# Patient Record
Sex: MALE | Race: WHITE | NOT HISPANIC OR LATINO | Employment: OTHER | URBAN - METROPOLITAN AREA
[De-identification: names, ages, dates, MRNs, and addresses within clinical notes are randomized per-mention and may not be internally consistent; named-entity substitution may affect disease eponyms.]

---

## 2019-04-15 ENCOUNTER — HOSPITAL ENCOUNTER (OUTPATIENT)
Dept: CT IMAGING | Facility: CLINIC | Age: 64
Discharge: HOME | End: 2019-04-15
Attending: PHYSICIAN ASSISTANT | Admitting: PHYSICIAN ASSISTANT
Payer: MEDICARE

## 2019-04-15 ENCOUNTER — HOSPITAL ENCOUNTER (OUTPATIENT)
Dept: GENERAL RADIOLOGY | Facility: CLINIC | Age: 64
Discharge: HOME | End: 2019-04-15
Attending: PHYSICIAN ASSISTANT | Admitting: PHYSICIAN ASSISTANT
Payer: MEDICARE

## 2019-04-15 DIAGNOSIS — K22.70 BARRETT'S ESOPHAGUS WITHOUT DYSPLASIA: ICD-10-CM

## 2019-04-15 DIAGNOSIS — R13.10 DYSPHAGIA, UNSPECIFIED TYPE: ICD-10-CM

## 2019-04-15 DIAGNOSIS — R03.0 ELEVATED BLOOD PRESSURE READING WITHOUT DIAGNOSIS OF HYPERTENSION: ICD-10-CM

## 2019-04-15 DIAGNOSIS — Z71.3 DIETARY COUNSELING AND SURVEILLANCE: ICD-10-CM

## 2019-04-15 PROCEDURE — 25000128 H RX IP 250 OP 636: Performed by: PHYSICIAN ASSISTANT

## 2019-04-15 PROCEDURE — 74220 X-RAY XM ESOPHAGUS 1CNTRST: CPT

## 2019-04-15 PROCEDURE — 71260 CT THORAX DX C+: CPT

## 2019-04-15 PROCEDURE — 25500045 ZZH RX 255: Performed by: PHYSICIAN ASSISTANT

## 2019-04-15 RX ORDER — IOPAMIDOL 755 MG/ML
500 INJECTION, SOLUTION INTRAVASCULAR ONCE
Status: COMPLETED | OUTPATIENT
Start: 2019-04-15 | End: 2019-04-15

## 2019-04-15 RX ADMIN — SODIUM CHLORIDE 60 ML: 9 INJECTION, SOLUTION INTRAVENOUS at 13:26

## 2019-04-15 RX ADMIN — IOPAMIDOL 80 ML: 755 INJECTION, SOLUTION INTRAVENOUS at 13:26

## 2019-04-15 RX ADMIN — ANTACID/ANTIFLATULENT 4 G: 380; 550; 10; 10 GRANULE, EFFERVESCENT ORAL at 14:00

## 2019-09-05 ENCOUNTER — HOSPITAL ENCOUNTER (OUTPATIENT)
Dept: GENERAL RADIOLOGY | Facility: CLINIC | Age: 64
Discharge: HOME | End: 2019-09-05
Attending: INTERNAL MEDICINE | Admitting: INTERNAL MEDICINE
Payer: MEDICARE

## 2019-09-05 DIAGNOSIS — K22.0 ACHALASIA: ICD-10-CM

## 2019-09-05 PROCEDURE — 74220 X-RAY XM ESOPHAGUS 1CNTRST: CPT

## 2019-11-13 ENCOUNTER — HOSPITAL ENCOUNTER (OUTPATIENT)
Dept: SPEECH THERAPY | Facility: CLINIC | Age: 64
Setting detail: THERAPIES SERIES
End: 2019-11-13
Attending: OTOLARYNGOLOGY
Payer: MEDICARE

## 2019-11-13 PROCEDURE — 31579 LARYNGOSCOPY TELESCOPIC: CPT | Performed by: STUDENT IN AN ORGANIZED HEALTH CARE EDUCATION/TRAINING PROGRAM

## 2019-11-13 PROCEDURE — 92524 BEHAVRAL QUALIT ANALYS VOICE: CPT | Mod: GN | Performed by: STUDENT IN AN ORGANIZED HEALTH CARE EDUCATION/TRAINING PROGRAM

## 2019-12-03 NOTE — PROGRESS NOTES
[facility]          OUTPATIENT SPEECH LANGUAGE PATHOLOGY VOICE EVALUATION  PLAN OF TREATMENT FOR OUTPATIENT REHABILITATION  (COMPLETE FOR INITIAL CLAIMS ONLY)    Patient's Last Name, First Name, M.I.  YOB: 1955  Jose Bennett                           Provider s Name: [facility] Medical Record No.  [id number]     Onset Date:  10/28/2019    Start of Care Date:  11/13/2019   Type:     ___PT  __OT   _X_SLP    Medical Diagnosis: Dysphonia   Speech Language Pathology Diagnosis:  Dysphonia    Visits from SOC: 1      _________________________________________________________________________________  Plan of Treatment/Functional Goals:  Voice         Goals     1. Goal Identifier: Generalization       Goal Description: Patient will report a week of typical activities in which dysphonia does not exceed a level of 2 out of 10, 90% of the time, so that patient is able to meet his vocal demands at work and at home.       Target Date: 02/11/20   2. Goal Identifier: Voice quality       Goal Description: In a 20-minute speech task, patient will demonstrate roughness, breathiness, and strain that do not exceed a level of 2 out of 10, 90% of the time by SLP judgment, so that patient is able to meet his voice quality demands.       Target Date: 02/11/20   3. Goal Identifier: Vocal function       Goal Description: In order to improve laryngeal strength, flexibility, and coordination for daily vocal tasks, patient will extend average maximum phonation time in exercise 4 of modified Vocal Function Exercises to 14 seconds without strain when given min assist.       Target Date: 02/11/20                    Frequency and Duration: 1x/week for 7 weeks with 2-3 monthly follow-ups  Allison E. Alpers, SLP       I CERTIFY THE NEED FOR THESE SERVICES FURNISHED UNDER        THIS PLAN OF TREATMENT AND WHILE UNDER   CARE .             Physician Signature               Date    X_____________________________________________________                      Certification Date From:  11/13/19  Certification Date To:  02/11/20  Referring Provider: Pop Simpson MD                 Initial Assessment        See Epic Evaluation Start of Care

## 2019-12-03 NOTE — PROGRESS NOTES
[facility] OP SLP Voice Evaluation with Videostroboscopy  11/13/19 7422   General Information   Type Of Visit Initial   Start Of Care Date 11/13/19   Referring Physician Pop Zhang MD  (ENT)   Orders Evaluate And Treat;Other  (Videostroboscopy)   Medical Diagnosis Dysphonia   Onset Of Illness/injury Or Date Of Surgery 10/28/19  (order date)   Precautions/Limitations  swallowing precautions   Hearing WFL for 1:1 conversation in session   Surgical/Medical history reviewed Yes   Pertinent History Of Current Problem 63yo male presenting with worsening dysphonia over the past year.  Pt reports a gradual onset of hoarseness, especially after a procedure about 4-5 months ago for achalasia.  Pt reports that people notice that his voice is different, but have no difficulty understanding him.  Pt is unable to talk in a high-pitched voice, which he used to be able to do.  Pt denies difficulties with vocal fatigue, throat pain/discomfort, cough/throat clear, and breathing.  PMH significant for achalasia, prostate cancer, Lisa's esophagus, HLD.  Recent XR esophagram negative for reflux.  Recent chest CT significant for achalasia.   Prior Level of Functioning No previous problems.   Patient Role/employment History Employed  (Psychologist)   General Observations Pt reports that today is a typical voice day.   Patient/family Goals To learn what is causing his voice problems, to make sure his voice doesn't get worse   Personal Rating / Voice Use Rating   Describe the amount of voice use required for your job Moderate   Describe the intensity of voice use required for your job Moderate   Describe the amount of typical non-work voice use Moderate   Describe the intensity of typical non-work voice use Moderate   Comments Frequent voice use as a psychologist.  People occasionally ask him about what's wrong with his voice.  VHI-10: 1/40.   Evaluation Results   Voice Observations VISIBLE TENSION: neck.   Voice  Profile during conversation, 1 min monologue and paragraph reading   Voice Quality Airy;Scratchy;Thin   Voice quality comments SPEECH: Consistent mild-moderate strain and breathiness with intermittent mild roughness, particularly at ends of sentences.  SINGING: improved relative to speech, with less breathiness and strain.  THERAPY PROBES: good improvement in voice quality with flow mode, forward resonance, diaphragmatic engagement, and glottal closure probes.   Voice quality severity rating continuum (1=Severe, 7=WNL) 5  (CAPE-V Overall Severity: 26/100)   Breath control Tight   Breath Control comments Excessive thoracic muscle use pattern on inspiration for speech.  Poor respiratory/phonatory coordination.   Breath control severity rating continuum (1=Severe, 7=WNL) 5   Voice Use / Effort Pinched / squeezed larynx;Contraction of neck muscles;Throat push   Voice Use / Effort comments Pt rates his current phonatory effort for speech as 0/10 (10 is maximum effort).   Voice use / Effort severity rating continuum (1=Severe, 7=WNL) 5   Fundamental frequency (Hz)   (Centered around Eb3)   Pitch /Frequency Description Too low   Pitch / Frequency comments Pt is not stimulable for phonation in falsetto/loft register, with pt noting that this is a change.   Pitch / Frequency severity rating continuum (1=Severe, 7=WNL) 5   Volume comments Volume for conversational speech is WFL and appropriate.  A whisper is normal.  Soft phonation is consistent with conversational volume/quality.  Loud phonation is minimally breathy, with good volume increase and some neck involvement.   Volume severity rating continuum (1=Severe, 7=WNL) 6   Neuromuscular Control Other    Neuromuscular Control severity rating continuum (1=Severe, 7=WNL) 6   Neuromuscular comments See adduction/abduction function measure below   Resonance Muffled   Resonance severity rating continuum (1=Severe, 7=WNL) 5   Comments Mild-moderate dysphonia characterized by  roughness, breathiness, strain, muffled resonance, poor respiratory/phonatory coordination, lack of upper register, and tight laryngeal musculature with neck involvement during phonation.   Adduction /Abduction Function   Laryngeal diadokinetic speed   (WNL)   Laryngeal diadokinetic strength   (Strained)   Laryngeal diadokinetic consistency Run together;Regular  (Initially blurred, but improves with task duration)   Adduction / Abduction function scale Age 66+, norm per sec:  4   Vibratory Function of Vocal Folds   Prolonged 'ah' at mid pitch (sec) 4.8 seconds at E3 with consistent strain and increased breathiness   Prolonged 'ah' at high pitch (sec) 7.1 seconds at F3 with increased breathiness and strain and visible neck effort   Prolonged 'ah' at low pitch (sec) 11.4 seconds at F3 with increased strain and breathiness and some roughness   Vibratory Function of Vocal Folds Scale Males 20 - 80 yrs: 15 - 25 secs   Vibratory Function of Vocal Folds Comments Reduced in duration and quality   Function of Lengtheners / Shorteners (CT and TA Muscles)   Pitch glides Limited range;Upper register absent  (Lowest: Bb2; Highest: A3)   Videostroboscopy / Endoscopy   Scope Serial Number Pentax [id number], Aiwogrm=[id number]   Tissue description Smooth pink larynx tissues;White vocal fold tissues   Comment Fatigue and worsening sluggishness of right vocal fold observed in a task of quickly repeated vowels (/hi/-sniff), which may indicate impairment of the right superior laryngeal nerve and/or right recurrent laryngeal nerve.   Laryngeal movements Reduced lengthening of TFV;Adduction / abduction full range of motion (ROM)  (R TVF is sluggish compared to L, but with full ROM)   Vibratory characteristics Reduced vibrations right;Full mucosal wave left  (Open phase predominant, with asymmetry)   Vocal fold edges Smooth  (Bilaterally prominent vocal fold processes, R>L)   Glottal closure characteristics Anterior gap  (With prominent R vocal  process, improves with therapy probes)   Supraglottic activity Anterior-posterior compression;Ventricular folds hypertrophy - bilateral  (Mild-moderate, improves with therapy probes)   Other observations Flexible laryngoscopy with videostroboscopy completed following application of topical anesthetic (3% Lidocaine HCL, 0.25% Phenlyphrine HCL) and after obtaining informed verbal consent.  Scope was inserted throught the left nostril.  Pt tolerated the procedure well.   General Therapy Interventions   Planned Therapy Interventions Voice   Voice Larynx strengthening;Larynx and TVF flexibility;Larynx movement/coordination;Breath flow to sound flow;Voice quality/pitch or volume tasks;Resonant voice techniques   Impressions and Recommendations   Communication Diagnosis Dysphonia   Summary Mr. Bennett presents with mild-moderate dysphonia characterized by roughness, breathiness, strain, muffled resonance, poor respiratory/phonatory coordination, lack of upper register, and tight laryngeal musculature with neck involvement during phonation.  Based on today's evaluation and videostroboscopy, dysphonia is accounted for by a combination of vocal fold bowing/poor glottal closure and resulting aberrations in vibratory characteristics and mucosal wave, impaired laryngeal movements (sluggish right vocal fold, reduced elongation of true vocal folds) possibly indicating impairment of the superior and/or recurrent laryngeal nerves, and supraglottic hyperfunction.   Recommendations A course of skilled speech therapy is recommended in order to promote improved laryngeal strength, flexibility, and coordination for daily vocal tasks, optimize vocal technique, improve voice quality, and promote reduced laryngeal effort so that patient is able to meet his vocal demands at work and at home.   Frequency and Duration 1x/week for 7 weeks with 2-3 monthly follow-ups   Prognosis  Good with intervention   Risks and Benefits of Treatment have  been explained. Yes   Patient & /or Caregiver  in agreement with plan of care Yes   Patient Education SLP provided education regarding evaluation and videostroboscopy findings, as well as proposed POC.  Pt verbalized understanding.   Educational Assessment   Barriers to Learning No barriers   Preferred Learning Style Listening;Reading;Demonstration;Pictures / Video   Voice Goals   Voice Goals 1;2;3   Voice Goal 1   Goal Identifier Generalization   Goal Description Patient will report a week of typical activities in which dysphonia does not exceed a level of 2 out of 10, 90% of the time, so that patient is able to meet his vocal demands at work and at home.   Target Date 02/11/20   Voice Goal 2   Goal Identifier Voice quality   Goal Description In a 20-minute speech task, patient will demonstrate roughness, breathiness, and strain that do not exceed a level of 2 out of 10, 90% of the time by SLP judgment, so that patient is able to meet his voice quality demands.   Target Date 02/11/20   Voice Goal 3   Goal Identifier Vocal function   Goal Description In order to improve laryngeal strength, flexibility, and coordination for daily vocal tasks, patient will extend average maximum phonation time in exercise 4 of modified Vocal Function Exercises to 14 seconds without strain when given min assist.   Target Date 02/11/20   Total Session Time   Voice Minutes (68269) 45   Laryngoscopy W/Stroboscopy Minutes (90636) 30   Total Evaluation Time 75   Therapy Certification   Certification date from 11/13/19   Certification date to 02/11/20   Medical Diagnosis Dysphonia             Thank you for the referral of this patient.    Allison Alpers, B.A. (shannon), M.A., CCC-SLP  Speech-Language Pathologist  Certificate of Vocology  [facility]  [phone]

## 2022-01-09 ENCOUNTER — HOSPITAL ENCOUNTER (OUTPATIENT)
Facility: CLINIC | Age: 67
Setting detail: OBSERVATION
Discharge: HOME | End: 2022-01-10
Attending: EMERGENCY MEDICINE | Admitting: INTERNAL MEDICINE
Payer: MEDICARE

## 2022-01-09 ENCOUNTER — APPOINTMENT (OUTPATIENT)
Dept: ULTRASOUND IMAGING | Facility: CLINIC | Age: 67
End: 2022-01-09
Attending: EMERGENCY MEDICINE
Payer: MEDICARE

## 2022-01-09 ENCOUNTER — APPOINTMENT (OUTPATIENT)
Dept: CT IMAGING | Facility: CLINIC | Age: 67
End: 2022-01-09
Attending: EMERGENCY MEDICINE
Payer: MEDICARE

## 2022-01-09 DIAGNOSIS — K81.0 ACUTE CHOLECYSTITIS: ICD-10-CM

## 2022-01-09 LAB
ALBUMIN SERPL-MCNC: 3.6 G/DL (ref 3.4–5)
ALBUMIN UR-MCNC: NEGATIVE MG/DL
ALP SERPL-CCNC: 51 U/L (ref 40–150)
ALT SERPL W P-5'-P-CCNC: 29 U/L (ref 0–70)
ANION GAP SERPL CALCULATED.3IONS-SCNC: 7 MMOL/L (ref 3–14)
APPEARANCE UR: CLEAR
AST SERPL W P-5'-P-CCNC: 16 U/L (ref 0–45)
BACTERIA #/AREA URNS HPF: ABNORMAL /HPF
BASOPHILS # BLD AUTO: 0 10E3/UL (ref 0–0.2)
BASOPHILS NFR BLD AUTO: 0 %
BILIRUB SERPL-MCNC: 0.7 MG/DL (ref 0.2–1.3)
BILIRUB UR QL STRIP: NEGATIVE
BUN SERPL-MCNC: 20 MG/DL (ref 7–30)
CALCIUM SERPL-MCNC: 8.9 MG/DL (ref 8.5–10.1)
CHLORIDE BLD-SCNC: 102 MMOL/L (ref 94–109)
CO2 SERPL-SCNC: 26 MMOL/L (ref 20–32)
COLOR UR AUTO: ABNORMAL
CREAT SERPL-MCNC: 0.93 MG/DL (ref 0.66–1.25)
EOSINOPHIL # BLD AUTO: 0 10E3/UL (ref 0–0.7)
EOSINOPHIL NFR BLD AUTO: 0 %
ERYTHROCYTE [DISTWIDTH] IN BLOOD BY AUTOMATED COUNT: 12.1 % (ref 10–15)
GFR SERPL CREATININE-BSD FRML MDRD: 89 ML/MIN/1.73M2
GLUCOSE BLD-MCNC: 184 MG/DL (ref 70–99)
GLUCOSE UR STRIP-MCNC: NEGATIVE MG/DL
HCT VFR BLD AUTO: 43.9 % (ref 40–53)
HGB BLD-MCNC: 14.9 G/DL (ref 13.3–17.7)
HGB UR QL STRIP: NEGATIVE
IMM GRANULOCYTES # BLD: 0.1 10E3/UL
IMM GRANULOCYTES NFR BLD: 0 %
KETONES UR STRIP-MCNC: NEGATIVE MG/DL
LEUKOCYTE ESTERASE UR QL STRIP: NEGATIVE
LIPASE SERPL-CCNC: 60 U/L (ref 73–393)
LYMPHOCYTES # BLD AUTO: 1.4 10E3/UL (ref 0.8–5.3)
LYMPHOCYTES NFR BLD AUTO: 9 %
MCH RBC QN AUTO: 32.3 PG (ref 26.5–33)
MCHC RBC AUTO-ENTMCNC: 33.9 G/DL (ref 31.5–36.5)
MCV RBC AUTO: 95 FL (ref 78–100)
MONOCYTES # BLD AUTO: 0.7 10E3/UL (ref 0–1.3)
MONOCYTES NFR BLD AUTO: 5 %
NEUTROPHILS # BLD AUTO: 14.3 10E3/UL (ref 1.6–8.3)
NEUTROPHILS NFR BLD AUTO: 86 %
NITRATE UR QL: NEGATIVE
NRBC # BLD AUTO: 0 10E3/UL
NRBC BLD AUTO-RTO: 0 /100
PH UR STRIP: 6 [PH] (ref 5–7)
PLATELET # BLD AUTO: 208 10E3/UL (ref 150–450)
POTASSIUM BLD-SCNC: 3.3 MMOL/L (ref 3.4–5.3)
PROT SERPL-MCNC: 7 G/DL (ref 6.8–8.8)
RBC # BLD AUTO: 4.62 10E6/UL (ref 4.4–5.9)
RBC URINE: 1 /HPF
SARS-COV-2 RNA RESP QL NAA+PROBE: NEGATIVE
SODIUM SERPL-SCNC: 135 MMOL/L (ref 133–144)
SP GR UR STRIP: 1.01 (ref 1–1.03)
UROBILINOGEN UR STRIP-MCNC: NORMAL MG/DL
WBC # BLD AUTO: 16.5 10E3/UL (ref 4–11)
WBC URINE: 1 /HPF

## 2022-01-09 PROCEDURE — 96368 THER/DIAG CONCURRENT INF: CPT

## 2022-01-09 PROCEDURE — 36415 COLL VENOUS BLD VENIPUNCTURE: CPT | Performed by: EMERGENCY MEDICINE

## 2022-01-09 PROCEDURE — 96375 TX/PRO/DX INJ NEW DRUG ADDON: CPT | Mod: XU

## 2022-01-09 PROCEDURE — 99285 EMERGENCY DEPT VISIT HI MDM: CPT | Mod: 25

## 2022-01-09 PROCEDURE — 74177 CT ABD & PELVIS W/CONTRAST: CPT

## 2022-01-09 PROCEDURE — 96366 THER/PROPH/DIAG IV INF ADDON: CPT

## 2022-01-09 PROCEDURE — 93005 ELECTROCARDIOGRAM TRACING: CPT

## 2022-01-09 PROCEDURE — 250N000011 HC RX IP 250 OP 636: Performed by: EMERGENCY MEDICINE

## 2022-01-09 PROCEDURE — 99219 PR INITIAL OBSERVATION CARE,LEVEL II: CPT | Performed by: PHYSICIAN ASSISTANT

## 2022-01-09 PROCEDURE — 96365 THER/PROPH/DIAG IV INF INIT: CPT | Mod: 59

## 2022-01-09 PROCEDURE — 81001 URINALYSIS AUTO W/SCOPE: CPT | Performed by: EMERGENCY MEDICINE

## 2022-01-09 PROCEDURE — 250N000011 HC RX IP 250 OP 636: Performed by: PHYSICIAN ASSISTANT

## 2022-01-09 PROCEDURE — 83690 ASSAY OF LIPASE: CPT | Performed by: EMERGENCY MEDICINE

## 2022-01-09 PROCEDURE — 87635 SARS-COV-2 COVID-19 AMP PRB: CPT | Performed by: EMERGENCY MEDICINE

## 2022-01-09 PROCEDURE — C9803 HOPD COVID-19 SPEC COLLECT: HCPCS

## 2022-01-09 PROCEDURE — 85025 COMPLETE CBC W/AUTO DIFF WBC: CPT | Performed by: EMERGENCY MEDICINE

## 2022-01-09 PROCEDURE — 76705 ECHO EXAM OF ABDOMEN: CPT

## 2022-01-09 PROCEDURE — G0378 HOSPITAL OBSERVATION PER HR: HCPCS

## 2022-01-09 PROCEDURE — 80053 COMPREHEN METABOLIC PANEL: CPT | Performed by: EMERGENCY MEDICINE

## 2022-01-09 RX ORDER — SODIUM CHLORIDE AND POTASSIUM CHLORIDE 150; 900 MG/100ML; MG/100ML
INJECTION, SOLUTION INTRAVENOUS CONTINUOUS
Status: DISCONTINUED | OUTPATIENT
Start: 2022-01-09 | End: 2022-01-10 | Stop reason: HOSPADM

## 2022-01-09 RX ORDER — MORPHINE SULFATE 2 MG/ML
2-4 INJECTION, SOLUTION INTRAMUSCULAR; INTRAVENOUS
Status: DISCONTINUED | OUTPATIENT
Start: 2022-01-09 | End: 2022-01-10 | Stop reason: HOSPADM

## 2022-01-09 RX ORDER — PROCHLORPERAZINE MALEATE 5 MG
5 TABLET ORAL EVERY 6 HOURS PRN
Status: DISCONTINUED | OUTPATIENT
Start: 2022-01-09 | End: 2022-01-10 | Stop reason: HOSPADM

## 2022-01-09 RX ORDER — ACETAMINOPHEN 325 MG/1
650 TABLET ORAL EVERY 6 HOURS PRN
Status: DISCONTINUED | OUTPATIENT
Start: 2022-01-09 | End: 2022-01-10 | Stop reason: HOSPADM

## 2022-01-09 RX ORDER — ACETAMINOPHEN 650 MG/1
650 SUPPOSITORY RECTAL EVERY 6 HOURS PRN
Status: DISCONTINUED | OUTPATIENT
Start: 2022-01-09 | End: 2022-01-10 | Stop reason: HOSPADM

## 2022-01-09 RX ORDER — AMOXICILLIN 250 MG
2 CAPSULE ORAL 2 TIMES DAILY PRN
Status: DISCONTINUED | OUTPATIENT
Start: 2022-01-09 | End: 2022-01-10 | Stop reason: HOSPADM

## 2022-01-09 RX ORDER — LIDOCAINE 40 MG/G
CREAM TOPICAL
Status: DISCONTINUED | OUTPATIENT
Start: 2022-01-09 | End: 2022-01-10 | Stop reason: HOSPADM

## 2022-01-09 RX ORDER — TESTOSTERONE GEL, 1% 10 MG/G
2 GEL TRANSDERMAL
COMMUNITY

## 2022-01-09 RX ORDER — KETOROLAC TROMETHAMINE 15 MG/ML
15 INJECTION, SOLUTION INTRAMUSCULAR; INTRAVENOUS ONCE
Status: COMPLETED | OUTPATIENT
Start: 2022-01-09 | End: 2022-01-09

## 2022-01-09 RX ORDER — OXYCODONE HYDROCHLORIDE 5 MG/1
5-10 TABLET ORAL EVERY 4 HOURS PRN
Status: DISCONTINUED | OUTPATIENT
Start: 2022-01-09 | End: 2022-01-10 | Stop reason: HOSPADM

## 2022-01-09 RX ORDER — AMPICILLIN AND SULBACTAM 2; 1 G/1; G/1
3 INJECTION, POWDER, FOR SOLUTION INTRAMUSCULAR; INTRAVENOUS ONCE
Status: COMPLETED | OUTPATIENT
Start: 2022-01-09 | End: 2022-01-09

## 2022-01-09 RX ORDER — AMOXICILLIN 250 MG
1 CAPSULE ORAL 2 TIMES DAILY PRN
Status: DISCONTINUED | OUTPATIENT
Start: 2022-01-09 | End: 2022-01-10 | Stop reason: HOSPADM

## 2022-01-09 RX ORDER — ONDANSETRON 2 MG/ML
4 INJECTION INTRAMUSCULAR; INTRAVENOUS
Status: DISCONTINUED | OUTPATIENT
Start: 2022-01-09 | End: 2022-01-09

## 2022-01-09 RX ORDER — PROCHLORPERAZINE 25 MG
12.5 SUPPOSITORY, RECTAL RECTAL EVERY 12 HOURS PRN
Status: DISCONTINUED | OUTPATIENT
Start: 2022-01-09 | End: 2022-01-10 | Stop reason: HOSPADM

## 2022-01-09 RX ORDER — ONDANSETRON 2 MG/ML
4 INJECTION INTRAMUSCULAR; INTRAVENOUS EVERY 6 HOURS PRN
Status: DISCONTINUED | OUTPATIENT
Start: 2022-01-09 | End: 2022-01-10 | Stop reason: HOSPADM

## 2022-01-09 RX ORDER — ONDANSETRON 4 MG/1
4 TABLET, ORALLY DISINTEGRATING ORAL EVERY 6 HOURS PRN
Status: DISCONTINUED | OUTPATIENT
Start: 2022-01-09 | End: 2022-01-10 | Stop reason: HOSPADM

## 2022-01-09 RX ORDER — KETOROLAC TROMETHAMINE 15 MG/ML
15 INJECTION, SOLUTION INTRAMUSCULAR; INTRAVENOUS EVERY 6 HOURS PRN
Status: DISCONTINUED | OUTPATIENT
Start: 2022-01-09 | End: 2022-01-10 | Stop reason: HOSPADM

## 2022-01-09 RX ORDER — IOPAMIDOL 755 MG/ML
90 INJECTION, SOLUTION INTRAVASCULAR ONCE
Status: COMPLETED | OUTPATIENT
Start: 2022-01-09 | End: 2022-01-09

## 2022-01-09 RX ADMIN — ONDANSETRON 4 MG: 2 INJECTION INTRAMUSCULAR; INTRAVENOUS at 14:14

## 2022-01-09 RX ADMIN — AMPICILLIN SODIUM AND SULBACTAM SODIUM 3 G: 2; 1 INJECTION, POWDER, FOR SOLUTION INTRAMUSCULAR; INTRAVENOUS at 17:19

## 2022-01-09 RX ADMIN — POTASSIUM CHLORIDE AND SODIUM CHLORIDE: 900; 150 INJECTION, SOLUTION INTRAVENOUS at 23:30

## 2022-01-09 RX ADMIN — KETOROLAC TROMETHAMINE 15 MG: 15 INJECTION, SOLUTION INTRAMUSCULAR; INTRAVENOUS at 14:15

## 2022-01-09 RX ADMIN — IOPAMIDOL 90 ML: 755 INJECTION, SOLUTION INTRAVENOUS at 14:34

## 2022-01-09 RX ADMIN — TAZOBACTAM SODIUM AND PIPERACILLIN SODIUM 3.38 G: 375; 3 INJECTION, SOLUTION INTRAVENOUS at 23:30

## 2022-01-09 NOTE — ED PROVIDER NOTES
History     Chief Complaint:    Abdominal Pain      HPI   Jose Bennett is a 66 year old male with history of kidney stone 10 to 12 years ago status post stent and lithotripsy, dilation procedure for achalasia, and prostatectomy, who presents for evaluation of diffuse abdominal pain.  He states the pain began to the periumbilical and epigastric area yesterday evening approximately 9:30 PM.  He has felt bloated but has had no nausea or vomiting (he indicates he is uncertain if he can vomit after his esophageal dilation procedure).  Overnight the pain was constant regardless of movement or taking Gas-X.  The pain is now moved slightly to the left upper left mid abdomen and 7.5 out of 10.  He feels the urge to defecate but has had no diarrhea or bowel movement.  He denies dysuria, hematuria, frequency, fevers, chills, or any other concerns.    Review of Systems  GI: + as per above  All other systems reviewed and negative      Allergies:  Pollen   Homeopathic products     Medications:    Omeprazole    Past Medical History:    Pituitary gland tumor benign  Achalasia   Hypercholesteremia   Hypogonadism   Vasovagal syncope   Ureterolithiasis   Hyperprolactinemia      Past Surgical History:    Hernia repair   Prostatectomy   Esophagogastroduodenoscopy   Peroral endoscopic myotomy  Pilonidal cyst excision      Family History:    The patient denies past family history.     Social History:  Presents with wife    Physical Exam     Patient Vitals for the past 24 hrs:   BP Temp Temp src Pulse Resp SpO2 Weight   01/09/22 1353 (!) 125/95 98.3  F (36.8  C) Temporal 103 18 100 % 81.2 kg (179 lb)       Physical Exam  Constitutional: Alert, attentive  HENT:    Nose: Nose normal.    Mouth/Throat: Oropharynx is clear, mucous membranes are moist  Eyes:  EOM are normal.    CV:  regular rate and rhythm; no murmurs, rubs or gallups  Chest: Effort normal and breath sounds normal.   GI:   Epigastric, LUQ, and RUQ tenderness   No  distension. Normal bowel sounds  MSK: Normal range of motion.   Neurological: Alert, attentive  Skin: Skin is warm and dry.      Emergency Department Course       Imaging:  US Abdomen Limited   Final Result   IMPRESSION:   1.  Distended gallbladder with debris in the gallbladder, gallbladder wall thickening, and pericholecystic fluid. Findings again represent acute cholecystitis.   2.  No intrahepatic bile duct dilatation. The extrahepatic bile ducts are obscured by bowel gas.            CT Abdomen Pelvis w Contrast   Final Result   IMPRESSION:    1.  The gallbladder is distended with pericolic cystic fluid and mild fat stranding, worrisome for acute cholecystitis. There is a small stone in the gallbladder neck/cystic duct, likely a cause of the gallbladder inflammation.   2.  Trace amount of free fluid in the pelvis, indeterminate, could be reactive.   3.  The distal esophagus is dilated, consistent with patient's history of achalasia.          Laboratory:  Labs Ordered and Resulted from Time of ED Arrival to Time of ED Departure   COMPREHENSIVE METABOLIC PANEL - Abnormal       Result Value    Sodium 135      Potassium 3.3 (*)     Chloride 102      Carbon Dioxide (CO2) 26      Anion Gap 7      Urea Nitrogen 20      Creatinine 0.93      Calcium 8.9      Glucose 184 (*)     Alkaline Phosphatase 51      AST 16      ALT 29      Protein Total 7.0      Albumin 3.6      Bilirubin Total 0.7      GFR Estimate 89     LIPASE - Abnormal    Lipase 60 (*)    ROUTINE UA WITH MICROSCOPIC - Abnormal    Color Urine Straw      Appearance Urine Clear      Glucose Urine Negative      Bilirubin Urine Negative      Ketones Urine Negative      Specific Gravity Urine 1.006      Blood Urine Negative      pH Urine 6.0      Protein Albumin Urine Negative      Urobilinogen Urine Normal      Nitrite Urine Negative      Leukocyte Esterase Urine Negative      Bacteria Urine Few (*)     RBC Urine 1      WBC Urine 1     CBC WITH PLATELETS AND  DIFFERENTIAL - Abnormal    WBC Count 16.5 (*)     RBC Count 4.62      Hemoglobin 14.9      Hematocrit 43.9      MCV 95      MCH 32.3      MCHC 33.9      RDW 12.1      Platelet Count 208      % Neutrophils 86      % Lymphocytes 9      % Monocytes 5      % Eosinophils 0      % Basophils 0      % Immature Granulocytes 0      NRBCs per 100 WBC 0      Absolute Neutrophils 14.3 (*)     Absolute Lymphocytes 1.4      Absolute Monocytes 0.7      Absolute Eosinophils 0.0      Absolute Basophils 0.0      Absolute Immature Granulocytes 0.1      Absolute NRBCs 0.0     COVID-19 VIRUS (CORONAVIRUS) BY PCR       Emergency Department Course:  Reviewed:  I reviewed nursing notes, vitals, past history and care everywhere    Assessments:   I obtained history and examined the patient as noted above.    I rechecked the patient and explained findings.     Consults:   Hospitalist    Interventions:  Medications   ondansetron (ZOFRAN) injection 4 mg (4 mg Intravenous Given 1/9/22 1414)   ketorolac (TORADOL) injection 15 mg (15 mg Intravenous Given 1/9/22 1415)   iopamidol (ISOVUE-370) solution 90 mL (90 mLs Intravenous Given 1/9/22 1434)   sodium chloride (PF) 0.9% PF flush 62 mL (62 mLs Intravenous Given 1/9/22 1435)   ampicillin-sulbactam (UNASYN) 3 g vial to attach to  mL bag (3 g Intravenous New Bag 1/9/22 1719)       Disposition:  Admitted to Hospitalist.    Impression & Plan      Medical Decision Making:  Jose Bennett is a 66 year old male who presents with generalized abdominal pain, worse to the left upper quadrant.  Broad work-up ultimately identifies cholecystitis as a cause of his pain.  No evidence of sepsis at this time.  No evidence of choledocholithiasis or pancreatitis.  Broad-spectrum antibiotics administered for the same.  Hospitalist to admit the patient and consult surgery, who anticipate cholecystectomy tomorrow.  He is safe for admission at this time      Diagnosis:    ICD-10-CM    1. Acute cholecystitis   K81.0           Colton Kat MD  01/09/22 1819

## 2022-01-09 NOTE — ED TRIAGE NOTES
Pt reports generalized abd pain and bloating. Reports still passing sohail. No n/v/d or dysuria. ABC intact.

## 2022-01-10 ENCOUNTER — ANESTHESIA EVENT (OUTPATIENT)
Dept: SURGERY | Facility: CLINIC | Age: 67
End: 2022-01-10
Payer: MEDICARE

## 2022-01-10 ENCOUNTER — APPOINTMENT (OUTPATIENT)
Dept: SURGERY | Facility: PHYSICIAN GROUP | Age: 67
End: 2022-01-10
Payer: MEDICARE

## 2022-01-10 ENCOUNTER — ANESTHESIA (OUTPATIENT)
Dept: SURGERY | Facility: CLINIC | Age: 67
End: 2022-01-10
Payer: MEDICARE

## 2022-01-10 VITALS
WEIGHT: 179 LBS | RESPIRATION RATE: 16 BRPM | OXYGEN SATURATION: 95 % | HEART RATE: 88 BPM | TEMPERATURE: 99.8 F | SYSTOLIC BLOOD PRESSURE: 144 MMHG | DIASTOLIC BLOOD PRESSURE: 89 MMHG | BODY MASS INDEX: 25.68 KG/M2

## 2022-01-10 LAB
ALBUMIN SERPL-MCNC: 3.2 G/DL (ref 3.4–5)
ALP SERPL-CCNC: 52 U/L (ref 40–150)
ALT SERPL W P-5'-P-CCNC: 64 U/L (ref 0–70)
ANION GAP SERPL CALCULATED.3IONS-SCNC: 5 MMOL/L (ref 3–14)
AST SERPL W P-5'-P-CCNC: 59 U/L (ref 0–45)
ATRIAL RATE - MUSE: 73 BPM
BILIRUB SERPL-MCNC: 2 MG/DL (ref 0.2–1.3)
BUN SERPL-MCNC: 22 MG/DL (ref 7–30)
CALCIUM SERPL-MCNC: 8.7 MG/DL (ref 8.5–10.1)
CHLORIDE BLD-SCNC: 107 MMOL/L (ref 94–109)
CO2 SERPL-SCNC: 26 MMOL/L (ref 20–32)
CREAT SERPL-MCNC: 1.15 MG/DL (ref 0.66–1.25)
DIASTOLIC BLOOD PRESSURE - MUSE: NORMAL MMHG
GFR SERPL CREATININE-BSD FRML MDRD: 69 ML/MIN/1.73M2
GLUCOSE BLD-MCNC: 149 MG/DL (ref 70–99)
HBA1C MFR BLD: 5.5 % (ref 0–5.6)
INTERPRETATION ECG - MUSE: NORMAL
P AXIS - MUSE: 56 DEGREES
POTASSIUM BLD-SCNC: 4 MMOL/L (ref 3.4–5.3)
PR INTERVAL - MUSE: 204 MS
PROT SERPL-MCNC: 6.5 G/DL (ref 6.8–8.8)
QRS DURATION - MUSE: 92 MS
QT - MUSE: 362 MS
QTC - MUSE: 398 MS
R AXIS - MUSE: -56 DEGREES
SODIUM SERPL-SCNC: 138 MMOL/L (ref 133–144)
SYSTOLIC BLOOD PRESSURE - MUSE: NORMAL MMHG
T AXIS - MUSE: 44 DEGREES
VENTRICULAR RATE- MUSE: 73 BPM

## 2022-01-10 PROCEDURE — 80053 COMPREHEN METABOLIC PANEL: CPT | Performed by: PHYSICIAN ASSISTANT

## 2022-01-10 PROCEDURE — 250N000009 HC RX 250: Performed by: SURGERY

## 2022-01-10 PROCEDURE — 83036 HEMOGLOBIN GLYCOSYLATED A1C: CPT | Performed by: INTERNAL MEDICINE

## 2022-01-10 PROCEDURE — 96375 TX/PRO/DX INJ NEW DRUG ADDON: CPT

## 2022-01-10 PROCEDURE — 47562 LAPAROSCOPIC CHOLECYSTECTOMY: CPT | Mod: AS | Performed by: PHYSICIAN ASSISTANT

## 2022-01-10 PROCEDURE — 88304 TISSUE EXAM BY PATHOLOGIST: CPT | Mod: TC | Performed by: SURGERY

## 2022-01-10 PROCEDURE — 250N000009 HC RX 250: Performed by: PHYSICIAN ASSISTANT

## 2022-01-10 PROCEDURE — 250N000011 HC RX IP 250 OP 636: Performed by: SURGERY

## 2022-01-10 PROCEDURE — 250N000013 HC RX MED GY IP 250 OP 250 PS 637: Performed by: ANESTHESIOLOGY

## 2022-01-10 PROCEDURE — 370N000017 HC ANESTHESIA TECHNICAL FEE, PER MIN: Performed by: SURGERY

## 2022-01-10 PROCEDURE — 250N000009 HC RX 250: Performed by: NURSE ANESTHETIST, CERTIFIED REGISTERED

## 2022-01-10 PROCEDURE — 96376 TX/PRO/DX INJ SAME DRUG ADON: CPT

## 2022-01-10 PROCEDURE — 250N000011 HC RX IP 250 OP 636: Performed by: PHYSICIAN ASSISTANT

## 2022-01-10 PROCEDURE — 710N000009 HC RECOVERY PHASE 1, LEVEL 1, PER MIN: Performed by: SURGERY

## 2022-01-10 PROCEDURE — 258N000001 HC RX 258: Performed by: SURGERY

## 2022-01-10 PROCEDURE — 258N000003 HC RX IP 258 OP 636: Performed by: NURSE ANESTHETIST, CERTIFIED REGISTERED

## 2022-01-10 PROCEDURE — 250N000011 HC RX IP 250 OP 636: Performed by: NURSE ANESTHETIST, CERTIFIED REGISTERED

## 2022-01-10 PROCEDURE — 360N000076 HC SURGERY LEVEL 3, PER MIN: Performed by: SURGERY

## 2022-01-10 PROCEDURE — 272N000001 HC OR GENERAL SUPPLY STERILE: Performed by: SURGERY

## 2022-01-10 PROCEDURE — 47562 LAPAROSCOPIC CHOLECYSTECTOMY: CPT | Performed by: SURGERY

## 2022-01-10 PROCEDURE — C9113 INJ PANTOPRAZOLE SODIUM, VIA: HCPCS | Performed by: PHYSICIAN ASSISTANT

## 2022-01-10 PROCEDURE — 710N000012 HC RECOVERY PHASE 2, PER MINUTE: Performed by: SURGERY

## 2022-01-10 PROCEDURE — 99204 OFFICE O/P NEW MOD 45 MIN: CPT | Mod: 57 | Performed by: SURGERY

## 2022-01-10 PROCEDURE — G0378 HOSPITAL OBSERVATION PER HR: HCPCS

## 2022-01-10 PROCEDURE — 36415 COLL VENOUS BLD VENIPUNCTURE: CPT | Performed by: PHYSICIAN ASSISTANT

## 2022-01-10 PROCEDURE — 999N000141 HC STATISTIC PRE-PROCEDURE NURSING ASSESSMENT: Performed by: SURGERY

## 2022-01-10 RX ORDER — SODIUM CHLORIDE, SODIUM LACTATE, POTASSIUM CHLORIDE, CALCIUM CHLORIDE 600; 310; 30; 20 MG/100ML; MG/100ML; MG/100ML; MG/100ML
INJECTION, SOLUTION INTRAVENOUS CONTINUOUS PRN
Status: DISCONTINUED | OUTPATIENT
Start: 2022-01-10 | End: 2022-01-10

## 2022-01-10 RX ORDER — OXYCODONE HYDROCHLORIDE 5 MG/1
5 TABLET ORAL EVERY 4 HOURS PRN
Status: DISCONTINUED | OUTPATIENT
Start: 2022-01-10 | End: 2022-01-10 | Stop reason: HOSPADM

## 2022-01-10 RX ORDER — BUPIVACAINE HYDROCHLORIDE 5 MG/ML
INJECTION, SOLUTION EPIDURAL; INTRACAUDAL PRN
Status: DISCONTINUED | OUTPATIENT
Start: 2022-01-10 | End: 2022-01-10 | Stop reason: HOSPADM

## 2022-01-10 RX ORDER — SODIUM CHLORIDE, SODIUM LACTATE, POTASSIUM CHLORIDE, CALCIUM CHLORIDE 600; 310; 30; 20 MG/100ML; MG/100ML; MG/100ML; MG/100ML
INJECTION, SOLUTION INTRAVENOUS CONTINUOUS
Status: DISCONTINUED | OUTPATIENT
Start: 2022-01-10 | End: 2022-01-10 | Stop reason: HOSPADM

## 2022-01-10 RX ORDER — ONDANSETRON 2 MG/ML
INJECTION INTRAMUSCULAR; INTRAVENOUS PRN
Status: DISCONTINUED | OUTPATIENT
Start: 2022-01-10 | End: 2022-01-10

## 2022-01-10 RX ORDER — HYDROCODONE BITARTRATE AND ACETAMINOPHEN 5; 325 MG/1; MG/1
1-2 TABLET ORAL EVERY 4 HOURS PRN
Qty: 10 TABLET | Refills: 0 | Status: SHIPPED | OUTPATIENT
Start: 2022-01-10

## 2022-01-10 RX ORDER — CEFAZOLIN SODIUM 2 G/100ML
2 INJECTION, SOLUTION INTRAVENOUS SEE ADMIN INSTRUCTIONS
Status: DISCONTINUED | OUTPATIENT
Start: 2022-01-10 | End: 2022-01-10 | Stop reason: HOSPADM

## 2022-01-10 RX ORDER — DEXAMETHASONE SODIUM PHOSPHATE 4 MG/ML
INJECTION, SOLUTION INTRA-ARTICULAR; INTRALESIONAL; INTRAMUSCULAR; INTRAVENOUS; SOFT TISSUE PRN
Status: DISCONTINUED | OUTPATIENT
Start: 2022-01-10 | End: 2022-01-10

## 2022-01-10 RX ORDER — HYDROMORPHONE HCL IN WATER/PF 6 MG/30 ML
0.4 PATIENT CONTROLLED ANALGESIA SYRINGE INTRAVENOUS EVERY 5 MIN PRN
Status: DISCONTINUED | OUTPATIENT
Start: 2022-01-10 | End: 2022-01-10 | Stop reason: HOSPADM

## 2022-01-10 RX ORDER — HYDROCODONE BITARTRATE AND ACETAMINOPHEN 5; 325 MG/1; MG/1
1 TABLET ORAL
Status: DISCONTINUED | OUTPATIENT
Start: 2022-01-10 | End: 2022-01-10 | Stop reason: HOSPADM

## 2022-01-10 RX ORDER — LABETALOL HYDROCHLORIDE 5 MG/ML
10 INJECTION, SOLUTION INTRAVENOUS EVERY 10 MIN PRN
Status: DISCONTINUED | OUTPATIENT
Start: 2022-01-10 | End: 2022-01-10 | Stop reason: HOSPADM

## 2022-01-10 RX ORDER — CEFAZOLIN SODIUM 2 G/100ML
2 INJECTION, SOLUTION INTRAVENOUS
Status: COMPLETED | OUTPATIENT
Start: 2022-01-10 | End: 2022-01-10

## 2022-01-10 RX ORDER — ONDANSETRON 2 MG/ML
4 INJECTION INTRAMUSCULAR; INTRAVENOUS EVERY 30 MIN PRN
Status: DISCONTINUED | OUTPATIENT
Start: 2022-01-10 | End: 2022-01-10 | Stop reason: HOSPADM

## 2022-01-10 RX ORDER — FENTANYL CITRATE 50 UG/ML
25 INJECTION, SOLUTION INTRAMUSCULAR; INTRAVENOUS EVERY 5 MIN PRN
Status: DISCONTINUED | OUTPATIENT
Start: 2022-01-10 | End: 2022-01-10 | Stop reason: HOSPADM

## 2022-01-10 RX ORDER — FENTANYL CITRATE 50 UG/ML
25 INJECTION, SOLUTION INTRAMUSCULAR; INTRAVENOUS
Status: DISCONTINUED | OUTPATIENT
Start: 2022-01-10 | End: 2022-01-10 | Stop reason: HOSPADM

## 2022-01-10 RX ORDER — PHENYLEPHRINE HYDROCHLORIDE 10 MG/ML
INJECTION INTRAVENOUS PRN
Status: DISCONTINUED | OUTPATIENT
Start: 2022-01-10 | End: 2022-01-10

## 2022-01-10 RX ORDER — KETOROLAC TROMETHAMINE 15 MG/ML
15 INJECTION, SOLUTION INTRAMUSCULAR; INTRAVENOUS
Status: DISCONTINUED | OUTPATIENT
Start: 2022-01-10 | End: 2022-01-10 | Stop reason: HOSPADM

## 2022-01-10 RX ORDER — GLYCOPYRROLATE 0.2 MG/ML
INJECTION, SOLUTION INTRAMUSCULAR; INTRAVENOUS PRN
Status: DISCONTINUED | OUTPATIENT
Start: 2022-01-10 | End: 2022-01-10

## 2022-01-10 RX ORDER — FENTANYL CITRATE 50 UG/ML
INJECTION, SOLUTION INTRAMUSCULAR; INTRAVENOUS PRN
Status: DISCONTINUED | OUTPATIENT
Start: 2022-01-10 | End: 2022-01-10

## 2022-01-10 RX ORDER — MEPERIDINE HYDROCHLORIDE 25 MG/ML
25 INJECTION INTRAMUSCULAR; INTRAVENOUS; SUBCUTANEOUS ONCE
Status: DISCONTINUED | OUTPATIENT
Start: 2022-01-10 | End: 2022-01-10 | Stop reason: HOSPADM

## 2022-01-10 RX ORDER — NEOSTIGMINE METHYLSULFATE 1 MG/ML
VIAL (ML) INJECTION PRN
Status: DISCONTINUED | OUTPATIENT
Start: 2022-01-10 | End: 2022-01-10

## 2022-01-10 RX ORDER — ONDANSETRON 4 MG/1
4 TABLET, ORALLY DISINTEGRATING ORAL EVERY 30 MIN PRN
Status: DISCONTINUED | OUTPATIENT
Start: 2022-01-10 | End: 2022-01-10 | Stop reason: HOSPADM

## 2022-01-10 RX ADMIN — TAZOBACTAM SODIUM AND PIPERACILLIN SODIUM 3.38 G: 375; 3 INJECTION, SOLUTION INTRAVENOUS at 05:13

## 2022-01-10 RX ADMIN — PHENYLEPHRINE HYDROCHLORIDE 100 MCG: 10 INJECTION INTRAVENOUS at 11:17

## 2022-01-10 RX ADMIN — CEFAZOLIN SODIUM 2 G: 2 INJECTION, SOLUTION INTRAVENOUS at 11:00

## 2022-01-10 RX ADMIN — MIDAZOLAM 1 MG: 1 INJECTION INTRAMUSCULAR; INTRAVENOUS at 11:01

## 2022-01-10 RX ADMIN — KETOROLAC TROMETHAMINE 15 MG: 15 INJECTION, SOLUTION INTRAMUSCULAR; INTRAVENOUS at 01:55

## 2022-01-10 RX ADMIN — MORPHINE SULFATE 4 MG: 2 INJECTION, SOLUTION INTRAMUSCULAR; INTRAVENOUS at 09:50

## 2022-01-10 RX ADMIN — PANTOPRAZOLE SODIUM 40 MG: 40 INJECTION, POWDER, FOR SOLUTION INTRAVENOUS at 08:45

## 2022-01-10 RX ADMIN — ONDANSETRON HYDROCHLORIDE 4 MG: 2 INJECTION, SOLUTION INTRAVENOUS at 11:39

## 2022-01-10 RX ADMIN — OXYCODONE HYDROCHLORIDE 5 MG: 5 TABLET ORAL at 13:30

## 2022-01-10 RX ADMIN — ONDANSETRON 4 MG: 2 INJECTION INTRAMUSCULAR; INTRAVENOUS at 05:54

## 2022-01-10 RX ADMIN — FENTANYL CITRATE 50 MCG: 50 INJECTION, SOLUTION INTRAMUSCULAR; INTRAVENOUS at 11:15

## 2022-01-10 RX ADMIN — KETOROLAC TROMETHAMINE 15 MG: 15 INJECTION, SOLUTION INTRAMUSCULAR; INTRAVENOUS at 08:46

## 2022-01-10 RX ADMIN — FENTANYL CITRATE 50 MCG: 50 INJECTION, SOLUTION INTRAMUSCULAR; INTRAVENOUS at 11:07

## 2022-01-10 RX ADMIN — NEOSTIGMINE METHYLSULFATE 3 MG: 1 INJECTION, SOLUTION INTRAVENOUS at 12:11

## 2022-01-10 RX ADMIN — SODIUM CHLORIDE, POTASSIUM CHLORIDE, SODIUM LACTATE AND CALCIUM CHLORIDE: 600; 310; 30; 20 INJECTION, SOLUTION INTRAVENOUS at 12:13

## 2022-01-10 RX ADMIN — SODIUM CHLORIDE, POTASSIUM CHLORIDE, SODIUM LACTATE AND CALCIUM CHLORIDE: 600; 310; 30; 20 INJECTION, SOLUTION INTRAVENOUS at 11:00

## 2022-01-10 RX ADMIN — DEXAMETHASONE SODIUM PHOSPHATE 4 MG: 4 INJECTION, SOLUTION INTRA-ARTICULAR; INTRALESIONAL; INTRAMUSCULAR; INTRAVENOUS; SOFT TISSUE at 11:08

## 2022-01-10 RX ADMIN — FENTANYL CITRATE 50 MCG: 50 INJECTION, SOLUTION INTRAMUSCULAR; INTRAVENOUS at 11:43

## 2022-01-10 RX ADMIN — FENTANYL CITRATE 50 MCG: 50 INJECTION, SOLUTION INTRAMUSCULAR; INTRAVENOUS at 11:39

## 2022-01-10 RX ADMIN — GLYCOPYRROLATE 0.4 MG: 0.2 INJECTION, SOLUTION INTRAMUSCULAR; INTRAVENOUS at 12:11

## 2022-01-10 RX ADMIN — MIDAZOLAM 1 MG: 1 INJECTION INTRAMUSCULAR; INTRAVENOUS at 11:05

## 2022-01-10 RX ADMIN — ROCURONIUM BROMIDE 40 MG: 50 INJECTION, SOLUTION INTRAVENOUS at 11:08

## 2022-01-10 RX ADMIN — PHENYLEPHRINE HYDROCHLORIDE 100 MCG: 10 INJECTION INTRAVENOUS at 11:31

## 2022-01-10 RX ADMIN — LIDOCAINE HYDROCHLORIDE 50 MG: 10 INJECTION, SOLUTION EPIDURAL; INFILTRATION; INTRACAUDAL; PERINEURAL at 11:07

## 2022-01-10 RX ADMIN — MORPHINE SULFATE 4 MG: 2 INJECTION, SOLUTION INTRAMUSCULAR; INTRAVENOUS at 05:54

## 2022-01-10 NOTE — ANESTHESIA POSTPROCEDURE EVALUATION
Patient: Jose Bennett    Procedure: Procedure(s):  CHOLECYSTECTOMY, LAPAROSCOPIC       Diagnosis:Acute cholecystitis [K81.0]  Diagnosis Additional Information: No value filed.    Anesthesia Type:  General    Note:     Postop Pain Control: Uneventful            Sign Out: Well controlled pain   PONV: No   Neuro/Psych: Uneventful            Sign Out: Acceptable/Baseline neuro status   Airway/Respiratory: Uneventful            Sign Out: Acceptable/Baseline resp. status   CV/Hemodynamics: Uneventful            Sign Out: Acceptable CV status   Other NRE: NONE   DID A NON-ROUTINE EVENT OCCUR? No           Last vitals:  Vitals Value Taken Time   BP 98/66 01/10/22 1245   Temp     Pulse 85 01/10/22 1255   Resp 20 01/10/22 1255   SpO2 94 % 01/10/22 1255   Vitals shown include unvalidated device data.    Electronically Signed By: Kyle Aj MD  January 10, 2022  12:56 PM

## 2022-01-10 NOTE — DISCHARGE SUMMARY
[facility]  Discharge Summary  Hospitalist      Date of Admission:  1/9/2022  Date of Discharge:  1/10/2022  Provider:  Germain Turk MD. [facility]  Date of Service (when I last saw the patient): 01/10/22      Primary Provider: [facility]          Discharge Diagnosis:     Discharge Diagnoses   Acute cholecystitis status postcholecystectomy    Other medical issues:  Past Medical History:   Diagnosis Date     Benign tumor of pituitary gland (H)          Please see the admission history and physical for full details.     Hospital Course     Jose Bennett was admitted on 1/9/2022.  The following problems were addressed during his hospitalization:  66 year old gentleman with esophageal achalasia s/p endoscopic myotomy 6/2019, Lisa's esophagus on daily PPI, GERD, prolactin-producing pituitary adenoma followed by Endocrine, and prostate cancer s/p prostatectomy who presented to [facility] ED on 1/9/2022 with intractable epigastric/LUQ pain and abdominal bloating and was found to have acute cholecystitis.  Patient was taken to surgery underwent laparoscopic cholecystectomy and discharged by surgeon prior to me seeing him today, my partner kept him on observation last night.  Per report he was doing well without complication.        Pending Results   Unresulted Labs Ordered in the Past 30 Days of this Admission     Date and Time Order Name Status Description    1/10/2022 11:44 AM Surgical Pathology Exam In process           Discharge Orders   No discharge procedures on file.    Code Status   Full Code       Primary Care Physician   [facility]    Physical Exam   Temp: 98  F (36.7  C) Temp src: Temporal BP: 127/78 Pulse: 83   Resp: 15 SpO2: 97 % O2 Device: None (Room air) Oxygen Delivery: 2 LPM  Vitals:    01/09/22 1353   Weight: 81.2 kg (179 lb)     Vital Signs with Ranges  Temp:  [98  F (36.7  C)-99.3  F (37.4  C)] 98  F (36.7  C)  Pulse:  [] 83  Resp:  [10-21]  15  BP: ()/(65-95) 127/78  SpO2:  [92 %-100 %] 97 %  I/O last 3 completed shifts:  In: 150 [P.O.:150]  Out: -     Constitutional:  alert, cooperative, no apparent distress  Respiratory: No increased work of breathing, good air exchange, no crackles or wheezing.  Cardiovascular: apical impulse,normal S1 and S2  GI: bowel sounds present, soft, non-distended, non-tender      Discharge Disposition   Discharged to home    Consultations This Hospital Stay   SURGERY GENERAL IP CONSULT    Time Spent on this Encounter   I, Germain Turk MD, discharged this patient today but I did not personally see the patient today and will not be billing for the patient's discharge.      Discharge Medications   Current Discharge Medication List      START taking these medications    Details   HYDROcodone-acetaminophen (NORCO) 5-325 MG tablet Take 1-2 tablets by mouth every 4 hours as needed for moderate to severe pain  Qty: 10 tablet, Refills: 0    Associated Diagnoses: Acute cholecystitis         CONTINUE these medications which have NOT CHANGED    Details   omeprazole (PRILOSEC) 20 MG DR capsule Take 20 mg by mouth daily      testosterone (ANDROGEL 1 % PUMP) 12.5 MG/ACT (1%) gel Place 2 Pump onto the skin every 72 hours as needed Apply 2 pumps to clean, dry, intact skin of shoulders, upper arms or abdomen. Do not apply to genitals.           Allergies   No Known Allergies  Data   Most Recent 3 CBC's:Recent Labs   Lab Test 01/09/22  1416   WBC 16.5*   HGB 14.9   MCV 95         Most Recent 3 BMP's:  Recent Labs   Lab Test 01/10/22  0812 01/09/22  1416    135   POTASSIUM 4.0 3.3*   CHLORIDE 107 102   CO2 26 26   BUN 22 20   CR 1.15 0.93   ANIONGAP 5 7   BERHANE 8.7 8.9   * 184*     Most Recent 2 LFT's:  Recent Labs   Lab Test 01/10/22  0812 01/09/22  1416   AST 59* 16   ALT 64 29   ALKPHOS 52 51   BILITOTAL 2.0* 0.7     Most Recent INR's and Anticoagulation Dosing History:  Anticoagulation Dose History    There  is no flowsheet data to display.       Most Recent 3 Troponin's:No lab results found.  Most Recent Cholesterol Panel:No lab results found.  Most Recent 6 Bacteria Isolates From Any Culture (See EPIC Reports for Culture Details):No lab results found.  Most Recent TSH, T4 and A1c Labs:  Recent Labs   Lab Test 01/10/22  0812   A1C 5.5     Results for orders placed or performed during the hospital encounter of 01/09/22   CT Abdomen Pelvis w Contrast    Narrative    EXAM: CT ABDOMEN PELVIS W CONTRAST  LOCATION: [facility]  DATE/TIME: 1/9/2022 2:33 PM    INDICATION: Diffuse abd pain.  COMPARISON: None available.  TECHNIQUE: CT scan of the abdomen and pelvis was performed following injection of IV contrast. Multiplanar reformats were obtained. Dose reduction techniques were used.  CONTRAST: 90mL Isovue-370.    FINDINGS:   LOWER CHEST: The distal portion of the distal esophagus is dilated, consistent with patient's history of achalasia.    HEPATOBILIARY: No suspicious focal hepatic lesion. The gallbladder is dilated with gallbladder wall thickening and pericholecystic fluid, worrisome for acute cholecystitis. There is a stone in the gallbladder neck/cystic duct (series 4 image 47).    PANCREAS: No main pancreatic ductal dilatation or definite solid pancreatic mass.    SPLEEN: No splenomegaly.    ADRENAL GLANDS: No adrenal nodules.    KIDNEYS/BLADDER: No evidence kidney/ureteral stones or hydronephrosis in either kidney. 1.1 cm hypodense focus in the interpolar region of the left kidney (series 3 image 84), consistent with simple renal cyst.    BOWEL: Moderate amount stool throughout the colon, nonspecific, can be seen with constipation. No abnormally dilated bowel loops.    PERITONEUM: Trace amount of free fluid in the pelvis, indeterminate, could be reactive.    LYMPH NODES: No significant abdominopelvic lymphadenopathy.    VASCULATURE: Scattered atherosclerotic vascular calcific location of the  abdominal aorta and iliac vessels.    PELVIC ORGANS: The prostate gland is not visualized, likely surgically absent.    MUSCULOSKELETAL: Multilevel degenerative changes of the spine. No suspicious osseous lesion.      Impression    IMPRESSION:   1.  The gallbladder is distended with pericolic cystic fluid and mild fat stranding, worrisome for acute cholecystitis. There is a small stone in the gallbladder neck/cystic duct, likely a cause of the gallbladder inflammation.  2.  Trace amount of free fluid in the pelvis, indeterminate, could be reactive.  3.  The distal esophagus is dilated, consistent with patient's history of achalasia.   US Abdomen Limited    Narrative    EXAM: US ABDOMEN LIMITED  LOCATION: [facility]  DATE/TIME: 1/9/2022 3:43 PM    INDICATION: RUQ pain  COMPARISON: 01/09/2022 CT   TECHNIQUE: Limited abdominal ultrasound.    FINDINGS:    GALLBLADDER: The gallbladder is distended. There is tumefactive debris in the gallbladder. There is gallbladder wall thickening with pericholecystic fluid. Cannot assess for Norman's sign (pain medication administered).     BILE DUCTS: No intrahepatic biliary dilatation. The extrahepatic bile ducts are not visualized due to bowel gas.    LIVER: Normal parenchyma with smooth contour. No focal mass.    RIGHT KIDNEY: No hydronephrosis.    PANCREAS: The pancreas is obscured by overlying gas.    No ascites.        Impression    IMPRESSION:  1.  Distended gallbladder with debris in the gallbladder, gallbladder wall thickening, and pericholecystic fluid. Findings again represent acute cholecystitis.  2.  No intrahepatic bile duct dilatation. The extrahepatic bile ducts are obscured by bowel gas.                 Disclaimer: This note consists of symbols derived from keyboarding, dictation and/or voice recognition software. As a result, there may be errors in the script that have gone undetected. Please consider this when interpreting information found in  this chart.

## 2022-01-10 NOTE — ANESTHESIA PREPROCEDURE EVALUATION
Anesthesia Pre-Procedure Evaluation    Patient: Jose Bennett   MRN: [id number] : 1955        Preoperative Diagnosis: Acute cholecystitis [K81.0]    Procedure : Procedure(s):  CHOLECYSTECTOMY, LAPAROSCOPIC          Past Medical History:   Diagnosis Date     Benign tumor of pituitary gland (H)       History reviewed. No pertinent surgical history.   No Known Allergies   Social History     Tobacco Use     Smoking status: Not on file     Smokeless tobacco: Not on file   Substance Use Topics     Alcohol use: Not on file      Wt Readings from Last 1 Encounters:   22 81.2 kg (179 lb)        Anesthesia Evaluation   Pt has had prior anesthetic. Type: General.    No history of anesthetic complications       ROS/MED HX  ENT/Pulmonary:  - neg pulmonary ROS     Neurologic: Comment: prolactinoma      Cardiovascular:  - neg cardiovascular ROS     METS/Exercise Tolerance:     Hematologic:  - neg hematologic  ROS     Musculoskeletal:  - neg musculoskeletal ROS     GI/Hepatic: Comment: Lisa's esophagus    (+) esophageal disease,     Renal/Genitourinary:  - neg Renal ROS     Endo:  - neg endo ROS     Psychiatric/Substance Use:  - neg psychiatric ROS     Infectious Disease:  - neg infectious disease ROS     Malignancy:   (+) Malignancy, History of Prostate.Prostate CA Remission status post Surgery.        Other:  - neg other ROS          Physical Exam    Airway        Mallampati: II   TM distance: > 3 FB   Neck ROM: full   Mouth opening: > 3 cm    Respiratory Devices and Support         Dental  no notable dental history         Cardiovascular   cardiovascular exam normal          Pulmonary   pulmonary exam normal                OUTSIDE LABS:  CBC:   Lab Results   Component Value Date    WBC 16.5 (H) 2022    HGB 14.9 2022    HCT 43.9 2022     2022     BMP:   Lab Results   Component Value Date     01/10/2022     2022    POTASSIUM 4.0 01/10/2022    POTASSIUM 3.3 (L)  01/09/2022    CHLORIDE 107 01/10/2022    CHLORIDE 102 01/09/2022    CO2 26 01/10/2022    CO2 26 01/09/2022    BUN 22 01/10/2022    BUN 20 01/09/2022    CR 1.15 01/10/2022    CR 0.93 01/09/2022     (H) 01/10/2022     (H) 01/09/2022     COAGS: No results found for: PTT, INR, FIBR  POC: No results found for: BGM, HCG, HCGS  HEPATIC:   Lab Results   Component Value Date    ALBUMIN 3.2 (L) 01/10/2022    PROTTOTAL 6.5 (L) 01/10/2022    ALT 64 01/10/2022    AST 59 (H) 01/10/2022    ALKPHOS 52 01/10/2022    BILITOTAL 2.0 (H) 01/10/2022     OTHER:   Lab Results   Component Value Date    A1C 5.5 01/10/2022    BERHANE 8.7 01/10/2022    LIPASE 60 (L) 01/09/2022       Anesthesia Plan    ASA Status:  3   NPO Status:  NPO Appropriate    Anesthesia Type: General.     - Airway: ETT   Induction: Intravenous, Propofol.   Maintenance: Balanced.        Consents    Anesthesia Plan(s) and associated risks, benefits, and realistic alternatives discussed. Questions answered and patient/representative(s) expressed understanding.    - Discussed:     - Discussed with:  Patient         Postoperative Care    Pain management: IV analgesics, Oral pain medications, Multi-modal analgesia.   PONV prophylaxis: Ondansetron (or other 5HT-3), Dexamethasone or Solumedrol     Comments:                Kyle Aj MD

## 2022-01-10 NOTE — H&P
[facility]  History and Physical  Hospitalist       Date of Admission:  1/9/2022    Assessment & Plan   Jose Bennett is a delightful 66 year old gentleman with esophageal achalasia s/p endoscopic myotomy 6/2019, Lisa's esophagus on daily PPI, GERD, prolactin-producing pituitary adenoma followed by Endocrine, and prostate cancer s/p prostatectomy who presented to [facility] ED on 1/9/2022 with intractable epigastric/LUQ pain and abdominal bloating and was found to have acute cholecystitis.     Acute cholecystitis with sepsis  Prior episode of similar pain about 1 week ago that was self-limiting.  Now with severe episode lasting > 12 hours.  Pain improved with IV toradol.  Septic based on WBC of 16.5 and left shift.  Discussed with Gen Surg who do not perform elective cholecystectomies on weekend evenings and recommend overnight Observation with surgical eval in AM.   - Obs admission  - Gen Surg consult  - IV Zosyn   - IV fluids  - Antiemetics and pain control  - CLD until Midnight then NPO in anticipation of OR in AM     Hypokalemia  Likely related to poor oral intake.  Asymptomatic.  Will add potassium to IV fluids.     Lisa's esophagus // GERD  Change PPI to IV      Clinically Significant Risk Factors Present on Admission                  COVID status: Pending  DVT Prophylaxis: Ambulate every shift  Code Status:  Full    Disposition: Expected discharge home tomorrow after procedure      Selma White, PAC  Hospitalist Service  [facility]  Text Page (7AM - 5PM, M-F)      PRIMARY CARE PROVIDER: [facility]    CHIEF COMPLAINT  Epigastric and LUQ pain    History is obtained from the patient    HISTORY OF PRESENT ILLNESS  Jose Bennett is a 66 year old male with esophageal achalasia s/p endoscopic myotomy 6/2019, Lisa's esophagus on daily PPI, GERD, prolactin-producing pituitary adenoma followed by Endocrine, and prostate cancer s/p  "prostatectomy who presented to [facility] ED on 1/9/2022 with intractable epigastric/LUQ pain and abdominal bloating.  Pain similar to an episode occurring last week that resolved on its own after 4 hours.  Pain is in the epigastrium and perhaps over into the left upper quadrant.  Is associated with abdominal bloating and nausea.  Pain feels like \"pressure\" it is very severe.  It began again yesterday and lasted all night.  Patient reported pain to be 10/10 however he had hoped it would resolve like it did last week and so he continued to delay coming into the ER for evaluation.  Ultimately, the pain was too severe and so he came in.    In the ED, /82, HR 73, RR 16, SPO2 99% at rest on room air, temperature 98.3.  CMP notable for potassium 3.3 and glucose 184 but otherwise within normal limits.  CBC notable for WBC 16.5 with a left shift.  UA bland.  CT abdomen/pelvis revealed dilated distal esophagus compatible with patient's history of achalasia, dilated gallbladder with gallbladder wall thickening and pericholecystic fluid worrisome for acute cholecystitis with a stone present in the gallbladder neck, moderate amount of stool in the colon, and no significant abdominopelvic lymphadenopathy..  Abdominal U/S revealed distended gallbladder with debris in the gallbladder, gallbladder wall thickening, and pericholecystic fluid suggesting acute cholecystitis; no intrahepatic or extrahepatic biliary ductal dilatation.  Patient empirically started on Unasyn.  Pain resolved with dose of IV Toradol.  Admission requested for acute cholecystitis.    Patient seen in the ER where he remains relatively pain-free.  He does note a slight headache and feels chilled.  No recent fever, chills, night sweats, URI symptoms, chest pain, shortness of breath.  He is very active exercising multiple times a week.  He does not smoke.  He drinks 3 beers per week.  He takes only 3 times weekly testosterone and daily PPI.  He has had no issues " with surgery in the past including a prior minimally invasive prostatectomy.  He notes a benign pituitary tumor followed by Endocrine with Q6 months surveillance visits.  Up until last week, had not had any issues with abdominal pain or cramping.  No changes in bowels or trouble with urination.      PAST MEDICAL HISTORY  I have reviewed this patient's medical history and updated it with pertinent information if needed.   1. Lisa's esophagus  2. Achalasia s/p esophageal myotomy 6/2019  3. Prolactin-producing pituitary adenoma followed by endocrine (Dr. Finney).  On no medications for this.  Followed with Q6 months surveillance visits.  4. Prostate cancer s/p prostatectomy  5. Low testosterone, on 3 times weekly testosterone replacement.  [? Given history of prostate cancer, unclear if Urology has okayed this or is conducting frequent PSA surveillance.]      PAST SURGICAL HISTORY  I have reviewed this patient's surgical history and updated it with pertinent information if needed.  1. Minimally invasive prostatectomy  2. Esophageal myotomy 6/2019    PRIOR TO ADMISSION MEDICATIONS  Prilosec daily  Testosterone 3 times weekly    ALLERGIES  No Known Allergies    SOCIAL HISTORY  Patient is .  He works as a psychologist.  No tobacco use.  Occasional alcohol use drinking about 3 beers per week.  He is very active and exercises frequently.    FAMILY HISTORY  I have reviewed this patient's family history and updated it with pertinent information if needed.   No family history of gallbladder issues.  Father was a heavy smoker and had dementia and many small strokes.    REVIEW OF SYSTEMS:  14 point comprehensive ROS undertaken with pertinent positives and negatives as above and otherwise unremarkable.     PHYSICAL EXAM  Temp: 98.3  F (36.8  C) Temp src: Temporal BP: 105/82 Pulse: 73   Resp: 16 SpO2: 99 % O2 Device: None (Room air)    Vital Signs with Ranges  Temp:  [98.3  F (36.8  C)] 98.3  F (36.8  C)  Pulse:  []  73  Resp:  [16-18] 16  BP: (105-126)/(75-95) 105/82  SpO2:  [96 %-100 %] 99 %  179 lbs 0 oz    GENERAL:  Pleasant, cooperative, alert. Well developed, well nourished.  HEENT: Normocephalic, atraumatic.  Extra occular mm intact.  Sclera clear. PERRL.  Mucous membranes moist.    PULMONARY: Clear to auscultation bilaterally   CARDIAC: Regular  ABDOMEN: Soft, ND  MUSCULOSKELETAL:  Moving x 4 spontaneously with CMS intact x4.  Normal bulk and tone.    NEURO: Alert and oriented x3.  CN II-XII grossly intact and symmetric.  No ataxia or tremor.  Nonfocal.  SKIN:  Warm, pink, dry.    DATA  Data reviewed today:  I personally reviewed no images or EKG's today.    Recent Labs   Lab 01/09/22  1416   WBC 16.5*   HGB 14.9   MCV 95         POTASSIUM 3.3*   CHLORIDE 102   CO2 26   BUN 20   CR 0.93   ANIONGAP 7   BERHANE 8.9   *   ALBUMIN 3.6   PROTTOTAL 7.0   BILITOTAL 0.7   ALKPHOS 51   ALT 29   AST 16   LIPASE 60*       Recent Results (from the past 24 hour(s))   CT Abdomen Pelvis w Contrast    Narrative    EXAM: CT ABDOMEN PELVIS W CONTRAST  LOCATION: [facility]  DATE/TIME: 1/9/2022 2:33 PM    INDICATION: Diffuse abd pain.  COMPARISON: None available.  TECHNIQUE: CT scan of the abdomen and pelvis was performed following injection of IV contrast. Multiplanar reformats were obtained. Dose reduction techniques were used.  CONTRAST: 90mL Isovue-370.    FINDINGS:   LOWER CHEST: The distal portion of the distal esophagus is dilated, consistent with patient's history of achalasia.    HEPATOBILIARY: No suspicious focal hepatic lesion. The gallbladder is dilated with gallbladder wall thickening and pericholecystic fluid, worrisome for acute cholecystitis. There is a stone in the gallbladder neck/cystic duct (series 4 image 47).    PANCREAS: No main pancreatic ductal dilatation or definite solid pancreatic mass.    SPLEEN: No splenomegaly.    ADRENAL GLANDS: No adrenal nodules.    KIDNEYS/BLADDER: No  evidence kidney/ureteral stones or hydronephrosis in either kidney. 1.1 cm hypodense focus in the interpolar region of the left kidney (series 3 image 84), consistent with simple renal cyst.    BOWEL: Moderate amount stool throughout the colon, nonspecific, can be seen with constipation. No abnormally dilated bowel loops.    PERITONEUM: Trace amount of free fluid in the pelvis, indeterminate, could be reactive.    LYMPH NODES: No significant abdominopelvic lymphadenopathy.    VASCULATURE: Scattered atherosclerotic vascular calcific location of the abdominal aorta and iliac vessels.    PELVIC ORGANS: The prostate gland is not visualized, likely surgically absent.    MUSCULOSKELETAL: Multilevel degenerative changes of the spine. No suspicious osseous lesion.      Impression    IMPRESSION:   1.  The gallbladder is distended with pericolic cystic fluid and mild fat stranding, worrisome for acute cholecystitis. There is a small stone in the gallbladder neck/cystic duct, likely a cause of the gallbladder inflammation.  2.  Trace amount of free fluid in the pelvis, indeterminate, could be reactive.  3.  The distal esophagus is dilated, consistent with patient's history of achalasia.   US Abdomen Limited    Narrative    EXAM: US ABDOMEN LIMITED  LOCATION: [facility]  DATE/TIME: 1/9/2022 3:43 PM    INDICATION: RUQ pain  COMPARISON: 01/09/2022 CT   TECHNIQUE: Limited abdominal ultrasound.    FINDINGS:    GALLBLADDER: The gallbladder is distended. There is tumefactive debris in the gallbladder. There is gallbladder wall thickening with pericholecystic fluid. Cannot assess for Norman's sign (pain medication administered).     BILE DUCTS: No intrahepatic biliary dilatation. The extrahepatic bile ducts are not visualized due to bowel gas.    LIVER: Normal parenchyma with smooth contour. No focal mass.    RIGHT KIDNEY: No hydronephrosis.    PANCREAS: The pancreas is obscured by overlying gas.    No  ascites.        Impression    IMPRESSION:  1.  Distended gallbladder with debris in the gallbladder, gallbladder wall thickening, and pericholecystic fluid. Findings again represent acute cholecystitis.  2.  No intrahepatic bile duct dilatation. The extrahepatic bile ducts are obscured by bowel gas.            1 Principal Discharge DX:	Viral syndrome

## 2022-01-10 NOTE — DISCHARGE INSTRUCTIONS
HOME CARE FOLLOWING LAPAROSCOPIC CHOLECYSTECTOMY  EARL Jameson, JAVAD Gibson R. O Donnell, J. Shaheen    INCISIONAL CARE:  Replace the bandage over your incisions DAILY until all drainage stops, or if more comfortable to have in place.  If present, leave the steri-strips (white paper tapes) in place for 14 days after surgery.  If Dermabond (a type of skin glue) is present, leave in place until it wears/flakes off (2-3 weeks).     BATHING:  OK to shower 48 hours after surgery.  Avoid baths for 1 week after surgery.  You may wash your hair at any time.  Gently pat your incision dry after bathing.  Do not apply lotions, creams, or ointments to incisions.    ACTIVITY:  Light Activity -- you may immediately be up and about as tolerated.  Walking is encouraged, increase as tolerated.  Driving/Light Work-- when comfortable and off narcotic pain medications.  Strenuous Work/Activity -- limit lifting to 20 pounds for 2 weeks.  Progressively increase with time.  Active Sports (running, biking, etc.) -- cautiously resume after 2 weeks.    DISCOMFORT:  Local anesthetic placed at surgery should provide relief for 4-8 hours.  Begin taking pain pills before discomfort is severe.  Take the pain medication with some food, when possible, to minimize side effects.  Intermittent use of ice packs may help during the first 1-3 weeks after surgery.  Expect gradual improvement.    Over-the-counter anti-inflammatory medications (i.e. Ibuprofen/Advil/Motrin or Naprosyn/Aleve) may be used per package instructions in addition to or while tapering off the narcotic pain medications to decrease swelling and sensitivity.  DO NOT TAKE these Anti-inflammatory medications if your primary physician has advised against doing so, or if you have acid reflux, ulcer, or bleeding disorder, or take blood-thinner medications.  Call your primary physician or the surgery office if you have medication questions.    After laparoscopic  cholecystectomy, you may have shoulder or upper back discomfort due to the gas used during surgery.  This is temporary and should resolve within 2-3 days.  Frequent short walks may help with this.  You may have decreased energy level for 1-2 weeks after surgery related to your recovery.    DIET:  Start with liquids and gradually increase diet as tolerated.  Drink plenty of fluids.  While taking pain medications, consider use of a stool softener, increase your fiber in your diet, or add a fiber supplement (like Metamucil, Citrucel) to help prevent constipation - a possible side effect of pain medications.  It is not uncommon to experience some bowel changes (loose stools or constipation) after surgery.  Your body has to adapt to you no longer having a gall bladder.  To help minimize this side effect, avoid fatty foods for 1-2 weeks after surgery.  You may then slowly increase the amount of fatty foods in your diet.      NAUSEA:  If nauseated from the anesthetic/pain meds; rest in bed, get up cautiously with assistance, and drink clear liquids (juice, tea, broth).    FOLLOW-UP AFTER SURGERY:  -Our office will contact you approximately 2-3 weeks after surgery to check on your progress and answer any questions you may have.  If you are doing well, you will not need to return for an office appointment.  If any concerns are identified over the phone, we will help you make an appointment to see a provider.    -If you have not received a phone call, have any questions or concerns, or would like to be seen, please call us at [phone].  We are located at: [address], [address]; [place] [address]    -CONTACT US IF THE FOLLOWING DEVELOPS:   1. A fever that is above 101     2. Increased redness, warmth, drainage, bleeding, or swelling.   3. Pain that is not relieved by rest/ice and your prescription.   4.  Increasing pain after 48 hours.   5. Drainage that is thick, cloudy, yellow, green or white.   6. Any other  questions or concerns.      FREQUENTLY ASKED QUESTIONS:    Q:  How should my incision look?    A:  Normally your incision will appear slightly swollen with light redness directly along the incision itself as it heals.  It may feel like a bump or ridge as the healing/scarring happens, and over time (3-4 months) this bump or ridge feeling should slowly go away.  In general, clear or pink watery drainage can be normal at first as your incision heals, but should decrease over time.    Q:  How do I know if my incision is infected?  A:  Look at your incision for signs of infection, like redness around the incision spreading to surrounding skin, or drainage of cloudy or foul-smelling drainage.  If you feel warm, check your temperature to see if you are running a fever.    **If any of these things occur, please notify the nurse at our office.  We may need you to come into the office for an incision check.      Q:  How do I take care of my incision?  A:  If you have a dressing in place - Starting the day after surgery, replace the dressing 1-2 times a day until there is no further drainage from the incision.  At that time, a dressing is no longer needed.  Try to minimize tape on the skin if irritation is occurring at the tape sites.  If you have significant irritation from tape on the skin, please call the office to discuss other method of dressing your incision.    Small pieces of tape called  steri-strips  may be present directly overlying your incision; these may be removed 10 days after surgery unless otherwise specified by your surgeon.  If these tapes start to loosen at the ends, you may trim them back until they fall off or are removed.    A:  If you had  Dermabond  tissue glue used as a dressing (this causes your incision to look shiny with a clear covering over it) - This type of dressing wears off with time and does not require more dressings over the top unless it is draining around the glue as it wears off.  Do  not apply ointments or lotions over the incisions until the glue has completely worn off.    Q:  There is a piece of tape or a sticky  lead  still on my skin.  Can I remove this?  A:  Sometimes the sticky  leads  used for monitoring during surgery or for evaluation in the emergency department are not all removed while you are in the hospital.  These sometimes have a tab or metal dot on them.  You can easily remove these on your own, like taking off a band-aid.  If there is a gel substance under the  lead , simply wipe/clean it off with a washcloth or paper towel.      Q:  What can I do to minimize constipation (very hard stools, or lack of stools)?  A:  Stay well hydrated.  Increase your dietary fiber intake or take a fiber supplement -with plenty of water.  Walk around frequently.  You may consider an over-the-counter stool-softener.  Your Pharmacist can assist you with choosing one that is stocked at your pharmacy.  Constipation is also one of the most common side effects of pain medication.  If you are using pain medication, be pro-active and try to PREVENT problems with constipation by taking the steps above BEFORE constipation becomes a problem.    Q:  What do I do if I need more pain medications?  A:  Call the office to receive refills.  Be aware that certain pain meds cannot be called into a pharmacy and actually require a paper prescription.  A change may be made in your pain med as you progress thru your recovery period or if you have side effects to certain meds.    --Pain meds are NOT refilled after 5pm on weekdays, and NOT AT ALL on the weekends, so please look ahead to prevent problems.      Q:  Why am I having a hard time sleeping now that I am at home?  A:  Many medications you receive while you are in the hospital can impact your sleep for a number of days after your surgery/hospitalization.  Decreased level of activity and naps during the day may also make sleeping at night difficult.  Try to  minimize day-time naps, and get up frequently during the day to walk around your home during your recovery time.  Sleep aides may be of some help, but are not recommended for long-term use.      Q:  I am having some back discomfort.  What should I do?  A:  This may be related to certain positioning that was required for your surgery, extended periods of time in bed, or other changes in your overall activity level.  You may try ice, heat, acetaminophen, or ibuprofen to treat this temporarily.  Note that many pain medications have acetaminophen in them and would state this on the prescription bottle.  Be sure not to exceed the maximum of 4000mg per day of acetaminophen.     **If the pain you are having does not resolve, is severe, or is a flare of back pain you have had on other occasions prior to surgery, please contact your primary physician for further recommendations or for an appointment to be examined at their office.    Q:  Why am I having headaches?  A:  Headaches can be caused by many things:  caffeine withdrawal, use of pain meds, dehydration, high blood pressure, lack of sleep, over-activity/exhaustion, flare-up of usual migraine headaches.  If you feel this is related to muscle tension (a band-like feeling around the head, or a pressure at the low-back of the head) you may try ice or heat to this area.  You may need to drink more fluids (try electrolyte drink like Gatorade), rest, or take your usual migraine medications.   **If your headaches do not resolve, worsen, are accompanied by other symptoms, or if your blood pressure is high, please call your primary physician for recommendation and/or examination.    Q:  I am unable to urinate.  What do I do?  A:  A small percentage of people can have difficulty urinating initially after surgery.  This includes being able to urinate only a very small amount at a time and feeling discomfort or pressure in the very low abdomen.  This is called  urinary retention ,  and is actually an urgent situation.  Proceed to your nearest Emergency department for evaluation (not an Urgent Care Center).  Sometimes the bladder does not work correctly after certain medications you receive during surgery, or related to certain procedures.  You may need to have a catheter placed until your bladder recovers.  When planning to go to an Emergency department, it may help to call the ER to let them know you are coming in for this problem after a surgery.  This may help you get in quicker to be evaluated.  **If you have symptoms of a urinary tract infection, please contact your primary physician for the proper evaluation and treatment.          If you have other questions, please call the office Monday thru Friday between 8am and 5pm to discuss with the nurse or physician assistant.  #[phone]    There is a surgeon ON CALL on weekday evenings and over the weekend in case of urgent need only, and may be contacted at the same number.    If you are having an emergency, call 911 or proceed to your nearest emergency department.    GENERAL ANESTHESIA OR SEDATION ADULT DISCHARGE INSTRUCTIONS   SPECIAL PRECAUTIONS FOR 24 HOURS AFTER SURGERY    IT IS NOT UNUSUAL TO FEEL LIGHT-HEADED OR FAINT, UP TO 24 HOURS AFTER SURGERY OR WHILE TAKING PAIN MEDICATION.  IF YOU HAVE THESE SYMPTOMS; SIT FOR A FEW MINUTES BEFORE STANDING AND HAVE SOMEONE ASSIST YOU WHEN YOU GET UP TO WALK OR USE THE BATHROOM.    YOU SHOULD REST AND RELAX FOR THE NEXT 24 HOURS AND YOU MUST MAKE ARRANGEMENTS TO HAVE SOMEONE STAY WITH YOU FOR AT LEAST 24 HOURS AFTER YOUR DISCHARGE.  AVOID HAZARDOUS AND STRENUOUS ACTIVITIES.  DO NOT MAKE IMPORTANT DECISIONS FOR 24 HOURS.    DO NOT DRIVE ANY VEHICLE OR OPERATE MECHANICAL EQUIPMENT FOR 24 HOURS FOLLOWING THE END OF YOUR SURGERY.  EVEN THOUGH YOU MAY FEEL NORMAL, YOUR REACTIONS MAY BE AFFECTED BY THE MEDICATION YOU HAVE RECEIVED.    DO NOT DRINK ALCOHOLIC BEVERAGES FOR 24 HOURS FOLLOWING YOUR  SURGERY.    DRINK CLEAR LIQUIDS (APPLE JUICE, GINGER ALE, 7-UP, BROTH, ETC.).  PROGRESS TO YOUR REGULAR DIET AS YOU FEEL ABLE.    YOU MAY HAVE A DRY MOUTH, A SORE THROAT, MUSCLES ACHES OR TROUBLE SLEEPING.  THESE SHOULD GO AWAY AFTER 24 HOURS.    CALL YOUR DOCTOR FOR ANY OF THE FOLLOWING:  SIGNS OF INFECTION (FEVER, GROWING TENDERNESS AT THE SURGERY SITE, A LARGE AMOUNT OF DRAINAGE OR BLEEDING, SEVERE PAIN, FOUL-SMELLING DRAINAGE, REDNESS OR SWELLING.    IT HAS BEEN OVER 8 TO 10 HOURS SINCE SURGERY AND YOU ARE STILL NOT ABLE TO URINATE (PASS WATER).     You received Toradol, an IV form of Ibuprofen (Motrin) at 8:50 AM.  Do not take any Ibuprofen products until after 2:50 PM.

## 2022-01-10 NOTE — ED NOTES
[facility]  ED Nurse Handoff Report    Jose Bennett is a 66 year old male   ED Chief complaint: Abdominal Pain  . ED Diagnosis:   Final diagnoses:   Acute cholecystitis     Allergies: No Known Allergies    Code Status: Full Code  Activity level - Baseline/Home:  Independent. Activity Level - Current:   Independent. Lift room needed: No. Bariatric: No   Needed: No   Isolation: No. Infection: Not Applicable.     Vital Signs:   Vitals:    01/09/22 1515 01/09/22 1700 01/09/22 1730 01/09/22 1800   BP: 111/82 106/75 105/82 108/72   Pulse: 91 74 73 77   Resp:       Temp:       TempSrc:       SpO2: 96% 99% 99% 99%   Weight:           Cardiac Rhythm:  ,      Pain level:    Patient confused: No. Patient Falls Risk: No.   Elimination Status: Has voided   Patient Report - Initial Complaint: abdominal pain. Focused Assessment: abdominal tenderness  Tests Performed: labs and imaging Abnormal Results:   CT- acute cholecystitis    Labs Ordered and Resulted from Time of ED Arrival to Time of ED Departure   COMPREHENSIVE METABOLIC PANEL - Abnormal       Result Value    Sodium 135      Potassium 3.3 (*)     Chloride 102      Carbon Dioxide (CO2) 26      Anion Gap 7      Urea Nitrogen 20      Creatinine 0.93      Calcium 8.9      Glucose 184 (*)     Alkaline Phosphatase 51      AST 16      ALT 29      Protein Total 7.0      Albumin 3.6      Bilirubin Total 0.7      GFR Estimate 89     LIPASE - Abnormal    Lipase 60 (*)    ROUTINE UA WITH MICROSCOPIC - Abnormal    Color Urine Straw      Appearance Urine Clear      Glucose Urine Negative      Bilirubin Urine Negative      Ketones Urine Negative      Specific Gravity Urine 1.006      Blood Urine Negative      pH Urine 6.0      Protein Albumin Urine Negative      Urobilinogen Urine Normal      Nitrite Urine Negative      Leukocyte Esterase Urine Negative      Bacteria Urine Few (*)     RBC Urine 1      WBC Urine 1     CBC WITH PLATELETS AND DIFFERENTIAL -  Abnormal    WBC Count 16.5 (*)     RBC Count 4.62      Hemoglobin 14.9      Hematocrit 43.9      MCV 95      MCH 32.3      MCHC 33.9      RDW 12.1      Platelet Count 208      % Neutrophils 86      % Lymphocytes 9      % Monocytes 5      % Eosinophils 0      % Basophils 0      % Immature Granulocytes 0      NRBCs per 100 WBC 0      Absolute Neutrophils 14.3 (*)     Absolute Lymphocytes 1.4      Absolute Monocytes 0.7      Absolute Eosinophils 0.0      Absolute Basophils 0.0      Absolute Immature Granulocytes 0.1      Absolute NRBCs 0.0     COVID-19 VIRUS (CORONAVIRUS) BY PCR         Family Comments: wife at bedside  OBS brochure/video discussed/provided to patient:  Yes  ED Medications:   Medications   ondansetron (ZOFRAN) injection 4 mg (4 mg Intravenous Given 1/9/22 1414)   ketorolac (TORADOL) injection 15 mg (15 mg Intravenous Given 1/9/22 1415)   iopamidol (ISOVUE-370) solution 90 mL (90 mLs Intravenous Given 1/9/22 1434)   sodium chloride (PF) 0.9% PF flush 62 mL (62 mLs Intravenous Given 1/9/22 1435)   ampicillin-sulbactam (UNASYN) 3 g vial to attach to  mL bag (0 g Intravenous Stopped 1/9/22 1823)     Drips infusing:  No  For the majority of the shift, the patient's behavior Green.     Sepsis treatment initiated: No     Patient tested for COVID 19 prior to admission: YES    ED Nurse Name/Phone Number: Haylee Vee RN,   6:24 PM

## 2022-01-10 NOTE — ED NOTES
PRIMARY DIAGNOSIS: BILIARY COLIC/UNCOMPLICATED EARLY ACUTE CHOLECYSTITIS  OUTPATIENT/OBSERVATION GOALS TO BE MET BEFORE DISCHARGE:    1. Pain status: Improved with use of alternative comfort measures i.e.: food  2. Stable vital signs and labs (if performed) at disposition: Yes  3. Tolerating adequate PO diet: No  4. Successful cholecystectomy or clear follow up plan with General Surgery team if immediate surgery not performed No  5. ADLs back to baseline?  Yes  6. Activity and level of assistance: Ambulating independently.  7. Barriers to discharge noted Yes, surgery in am    Discharge Planner Nurse   Safe discharge environment identified: Yes  Barriers to discharge: Yes       Entered by: Lakesha Ortega 01/10/2022 12:03 AM     Please review provider order for any additional goals.   Nurse to notify provider when observation goals have been met and patient is ready for discharge.

## 2022-01-10 NOTE — BRIEF OP NOTE
[facility]    Brief Operative Note    Pre-operative diagnosis: Acute cholecystitis [K81.0]  Post-operative diagnosis Acute cholecystitis    Procedure: Procedure(s):  CHOLECYSTECTOMY, LAPAROSCOPIC  Surgeon: Surgeon(s) and Role:     * Thad Rick MD - Primary     * Claire Calvin PA-C - Assisting  Anesthesia: General   Estimated Blood Loss: Less than 10 ml    Drains: None  Specimens:   ID Type Source Tests Collected by Time Destination   1 : Gallbladder Tissue Gallbladder SURGICAL PATHOLOGY EXAM Thad Rick MD 1/10/2022 11:43 AM      Findings:   Distended and edematous gallbladder with diffuse wall thickening, sludge but no stones..  Complications: None.  Implants: * No implants in log *

## 2022-01-10 NOTE — ANESTHESIA PROCEDURE NOTES
Airway       Patient location during procedure: OR       Procedure Start/Stop Times: 1/10/2022 11:10 AM  Staff -        CRNA: Mayda Doran APRN CRNA       Performed By: CRNA  Consent for Airway        Urgency: elective  Indications and Patient Condition       Indications for airway management: pablo-procedural       Induction type:intravenous       Mask difficulty assessment: 1 - vent by mask    Final Airway Details       Final airway type: endotracheal airway       Successful airway: ETT - single  Endotracheal Airway Details        ETT size (mm): 8.0       Cuffed: yes       Successful intubation technique: direct laryngoscopy       DL Blade Type: MAC 4       Grade View of Cords: 1       Adjucts: stylet       Bite block used: Soft    Post intubation assessment        Placement verified by: capnometry        Number of attempts at approach: 1       Secured with: plastic tape       Ease of procedure: easy       Dentition: Intact

## 2022-01-10 NOTE — CONSULTS
Consult Date: 01/09/2022    REASON FOR CONSULTATION:  Acute cholecystitis.    HISTORY OF PRESENT ILLNESS:  Mr. Bennett is relatively healthy 66-year-old gentleman who came to the ED yesterday with abdominal pain that began on Saturday evening.  He states that he ate healthy cauliflower and squash soup, but began having epigastric and right upper quadrant as well as left upper quadrant pain that night.  The pain waxed and waned, but persisted throughout the day on Sunday, which is what prompted his presentation to the ER.  Here, he was noted to have a white blood cell count of 16,000 and an ultrasound which showed a distended gallbladder with wall thickening and pericholecystic fluid.  In addition, there was some thick sludge or debris consistent with microlithiasis.  Currently, the patient states that when his pain meds wear off, he has recurrent pain in the right upper quadrant and has no appetite.    PAST MEDICAL AND SURGICAL HISTORY:  Notable for a sports hernia repair, prostatectomy, EGD, pilonidal cyst excision.  He has a history of achalasia, hyperlipidemia, hypogonadism and vasovagal syncope.  He has had previous kidney stones.    CURRENT MEDICATIONS:  At the time of evaluation, the patient's only home medication was omeprazole.    ALLERGIES:  THE PATIENT HAS NO RECOGNIZED DRUG ALLERGIES.  HE DOES HAVE ENVIRONMENTAL ALLERGIES TO POLLEN AND HOMEOPATHIC PRODUCTS.    SOCIAL HISTORY:  The patient is .  He is here alone.  He is not a smoker.    PHYSICAL EXAMINATION:    VITAL SIGNS:  Mr. Bennett is afebrile, current temperature is 98.3, pulse 84 with blood pressure 114/78, respiratory rate is 16 with 93% saturations on room air.  GENERAL:  He is generally alert and appropriate, nontoxic.  HEENT:  He has no appreciable scleral icterus or jaundice.  CARDIOVASCULAR:  Heart sounds are regular, without murmurs.  LUNGS:  Breath sounds are clear, without wheezes or crackles.  ABDOMEN:  Soft.  He has no overt  distention.  He has no epigastric tenderness, but does have focal right upper quadrant tenderness with a positive Norman sign on deep inspiration.    LABORATORY EXAMINATION:  Notable for white blood cell count of 16.5 thousand, hemoglobin of 14.9.  LFTs are all normal.  Asymptomatic COVID swab is negative.    IMAGING STUDIES:  I personally reviewed the patient's ultrasound done yesterday; this does show distended gallbladder containing debris with wall thickening and some adjacent fluid.  There is no duct dilation.  Extrahepatic ducts were not visualized due to bowel gas.    IMPRESSION AND RECOMMENDATIONS:  A 66-year-old gentleman with persistent upper abdominal pain for more than 36 hours with tenderness in the right upper quadrant and a Norman's sign on deep inspiration.  This was coupled with a thickened gallbladder on ultrasound containing debris.  I diagrammed the biliary tree and discussed the pathophysiology of biliary colic, acute cholecystitis and choledocholithiasis.  It is my impression that he has acute cholecystitis due to his persistent pain and findings on ultrasound.  It sounds as though he may have had a similar episode, which was self-limited, lasting a couple hours about a month or so ago.  I have recommended cholecystectomy today.  Risks of the operation were outlined with him in detail.  These include infection, bleeding, harm to structures, open conversion, retained stone, bile leak, common duct injury and chronic diarrhea as well as the potential that he may discharge to home today.    Thank you very much for this consultation.    Thad Peace MD        D: 01/10/2022   T: 01/10/2022   MT: [id number]    Name:     MIRIAM CHOUDHURY  MRN:      [id number]        Account:      [id number]   :      1955           Consult Date: 2022     Document: [id number]    cc:  [facility]

## 2022-01-10 NOTE — OP NOTE
Procedure Date: 01/10/2022    PREOPERATIVE DIAGNOSIS:  Acute cholecystitis.    POSTOPERATIVE DIAGNOSIS:  Acute cholecystitis.    PROCEDURE:  Laparoscopic cholecystectomy.    ANESTHESIA:  General plus local.    SURGEON:  Thad Randall MD    ASSISTANT:  Claire Calvin PA-C.  A physician assistant was medically necessary for skills in suturing, cutting suture, exposure, and camera management throughout the operation.    SPECIMENS:  Gallbladder and contents for routine handling.    COMPLICATIONS:  None.    INDICATIONS FOR PROCEDURE:  Mr. Bennett is a healthy 66-year-old gentleman with no preceding history of biliary tract disease, who came to the ED with persistent upper abdominal pain which began on Saturday evening.  Here, he was found to have a white blood cell of 16,000 and subsequent ultrasound showed a distended and thick-walled and edematous-appearing gallbladder.  The patient had persistent pain despite narcotics overnight and, because of this, I offered cholecystectomy to him today.  Risks of the operation were outlined with him in detail.  These include infection, bleeding, harm to structures, open conversion, retained stone, bile leak, common duct injury, chronic diarrhea.  The patient verbalized understanding of the above and decided to proceed.    FINDINGS:  The patient had a distended, thick-walled and edematous gallbladder consistent with acute cholecystitis.  The critical view of safety was obtained prior to clipping and dividing the cystic duct.    DESCRIPTION OF PROCEDURE:  With the patient under excellent general anesthesia in the supine position, the abdomen was prepped and draped out in the usual sterile surgical fashion.  A timeout was then performed confirming the patient, procedure to be done as well as drug allergies.  He did receive a dose of Ancef for infection prophylaxis prior to beginning our procedure.  We began by elevating the inferior umbilical skin fold and incised it longitudinally  with an 11 blade.  Electrocautery and blunt dissection were carried out down to the level of the midline fascia, which was then grasped and elevated into view.  The fascia was incised sharply with a 15 blade.  Stay sutures were placed at the apices and the peritoneum was punctured bluntly with a Carmalt.  We introduced a Alyssa port through this defect, applied insufflation, and placed the patient head up and rolled him slightly to his left.  Three further 5 mm ports were placed under direct vision in the epigastrium and right upper quadrant.  Gallbladder was visualized, bluntly grasped and elevated.  It was too tensely distended to place a grasper upon it and we therefore cannulated with our aspiration needle and decompressed approximately 100 mL of dark green bile.  This allowed us to elevate the gallbladder and expose the infundibulum.  There were some fatty omental tissues around the infundibulum which were taken down with electrocautery.  The infundibulum was grasped and retracted both laterally and medially, incising the serosa on each half.  A Kitner was then used to complete these fatty tissues downwards, a [place] grasper was then used to bluntly surround the artery, and it was clipped and divided to open the window adjacent to this and the cystic duct itself.  This window was enlarged further with careful use of electrocautery.  At this juncture, the critical view of safety was obtained and confirmed.  We palpated the cystic duct to assure there were no stones within it.  It was subsequently clipped and divided and the gallbladder was dissected free from the liver bed with electrocautery without difficulty.  There was a posterior artery branch which was clipped and divided during the course of this dissection as well.  Once entirely freed, the specimen was placed in an EndoCatch pouch.  Right upper quadrant was then copiously irrigated, suctioned dry and found to be satisfactorily hemostatic.      The  upper ports were removed as was the Alyssa and insufflation was then released.  The specimen was delivered through the fascial defect in the fascia and passed off for routine pathology.  We closed the fascia with an 0 Vicryl stitch in a figure-of-eight fashion x2.  Stay sutures were then tied down over them.  Then, 30 mL of 0.5% plain Marcaine was distributed in all incisions.  Skin was closed with 4-0 Vicryl in deep dermal fashion.  Skin glue was applied to the closed wounds.  The patient tolerated the procedure well.  He was extubated and brought to recovery in excellent condition.  All sharps and sponge counts were correct at the conclusion of the case.      Thad Peace MD        D: 01/10/2022   T: 01/10/2022   MT: GUANAKO    Name:     MIRIAM CHOUDHURYJuliette  MRN:      [id number]        Account:        [id number]   :      1955           Procedure Date: 01/10/2022     Document: [id number]    cc:  [place]

## 2022-01-10 NOTE — PHARMACY-ADMISSION MEDICATION HISTORY
Admission medication history interview status for this patient is complete. See [facility] admission navigator for allergy information, prior to admission medications and immunization status.     Medication history interview done, indicate source(s): Patient  Medication history resources (including written lists, pill bottles, clinic record):None  Pharmacy: [facility] in [facility] (off [address])    Changes made to PTA medication list:  Added: all  Changed: none  Reported as Not Taking: none  Removed: none    Actions taken by pharmacist (provider contacted, etc):None     Additional medication history information:None    Medication reconciliation/reorder completed by provider prior to medication history?  N    Prior to Admission medications    Medication Sig Last Dose Taking? Auth Provider   omeprazole (PRILOSEC) 20 MG DR capsule Take 20 mg by mouth daily 1/9/2022 at 0600 Yes Reported, Patient   testosterone (ANDROGEL 1 % PUMP) 12.5 MG/ACT (1%) gel Place 2 Pump onto the skin every 72 hours as needed Apply 2 pumps to clean, dry, intact skin of shoulders, upper arms or abdomen. Do not apply to genitals. 1/9/2022 Yes Reported, Patient

## 2022-01-10 NOTE — ANESTHESIA CARE TRANSFER NOTE
Patient: Jose Bennett    Procedure: Procedure(s):  CHOLECYSTECTOMY, LAPAROSCOPIC       Diagnosis: Acute cholecystitis [K81.0]  Diagnosis Additional Information: No value filed.    Anesthesia Type:   General     Note:    Oropharynx: oropharynx clear of all foreign objects and spontaneously breathing  Level of Consciousness: awake  Oxygen Supplementation: face mask    Independent Airway: airway patency satisfactory and stable  Dentition: dentition unchanged  Vital Signs Stable: post-procedure vital signs reviewed and stable  Report to RN Given: handoff report given  Patient transferred to: PACU    Handoff Report: Identifed the Patient, Identified the Reponsible Provider, Reviewed the pertinent medical history, Discussed the surgical course, Reviewed Intra-OP anesthesia mangement and issues during anesthesia, Set expectations for post-procedure period and Allowed opportunity for questions and acknowledgement of understanding      Vitals:  Vitals Value Taken Time   /86 01/10/22 1221   Temp     Pulse 69 01/10/22 1225   Resp 9 01/10/22 1225   SpO2 100 % 01/10/22 1225   Vitals shown include unvalidated device data.    Electronically Signed By: LUIS F Harris CRNA  January 10, 2022  12:27 PM

## 2022-01-11 ENCOUNTER — TELEPHONE (OUTPATIENT)
Dept: SURGERY | Facility: CLINIC | Age: 67
End: 2022-01-11

## 2022-01-11 ENCOUNTER — TELEPHONE (OUTPATIENT)
Dept: SURGERY | Facility: CLINIC | Age: 67
End: 2022-01-11
Payer: COMMERCIAL

## 2022-01-11 LAB
PATH REPORT.COMMENTS IMP SPEC: NORMAL
PATH REPORT.COMMENTS IMP SPEC: NORMAL
PATH REPORT.FINAL DX SPEC: NORMAL
PATH REPORT.GROSS SPEC: NORMAL
PATH REPORT.MICROSCOPIC SPEC OTHER STN: NORMAL
PATH REPORT.RELEVANT HX SPEC: NORMAL
PHOTO IMAGE: NORMAL

## 2022-01-11 PROCEDURE — 88304 TISSUE EXAM BY PATHOLOGIST: CPT | Mod: 26 | Performed by: PATHOLOGY

## 2022-01-11 NOTE — TELEPHONE ENCOUNTER
Name of caller:Latanya, spouse, she is with pt  Reason for Call:  Pt has a fever. He did talk to someone earlier today about his urine but now concerned about a fever.    Surgeon:  Dr. Randall    Recent Surgery:  Yes.    If yes, when & what type:  1/10/22, Laparoscopic cholecystectomy.      Best phone number to reach pt at is: [phone]  Ok to leave a message with medical info? Yes.    Pharmacy preferred (if calling for a refill): NA

## 2022-01-11 NOTE — TELEPHONE ENCOUNTER
Jose called today to report that his urine looks orange and he is concerned that it may be blood.  He has never had blood in his urine before. He had Laparoscopic cholecystectomy with Dr Randall yesterday.  He states otherwise he is feeling good.  He denies any pain with urination.  He did not have a catheter while he was hospitalized.  Advised Jose that this is most likely related to some degree of dehydration.  Advised him to increase his fluid intake, continue to monitor his urine.  He will call office tomorrow if there is no improvement or new symptoms.  He expressed understanding and all his questions were answered.    Celia Escoto PA-C

## 2022-01-12 ENCOUNTER — TELEPHONE (OUTPATIENT)
Dept: SURGERY | Facility: CLINIC | Age: 67
End: 2022-01-12
Payer: COMMERCIAL

## 2022-01-12 DIAGNOSIS — Z90.49 S/P LAPAROSCOPIC CHOLECYSTECTOMY: Primary | ICD-10-CM

## 2022-01-12 NOTE — TELEPHONE ENCOUNTER
"[facility] Call Note:     Patients wife Latanya called regarding her  Jose. She was concerned about a fever, 100.7. This has since resolved. She is also concerned about \"orange\" urine. The urine did clear but then came back orange one more time. This morning it is clear/yellow. We discussed his surgery briefly and discussed the low likelihood of a retained CBD stone. The plan at this time will be to observe for another 24 hours. If his urine turns orange again we will get LFTs and a UA. She is in agreement with this plan.    Bran Ruiz PA-C  "

## 2022-02-01 ENCOUNTER — TELEPHONE (OUTPATIENT)
Dept: SURGERY | Facility: CLINIC | Age: 67
End: 2022-02-01
Payer: COMMERCIAL

## 2022-02-01 NOTE — TELEPHONE ENCOUNTER
Attempted to call patient for post op check.  No answer.  Message was left for patient to call back if they had any questions of concerns.     Claire Calvin PA-C

## (undated) DEVICE — BLADE KNIFE SURG 11 371111

## (undated) DEVICE — ADH SKIN CLOSURE PREMIERPRO EXOFIN MICOR HV 0.5ML 3471

## (undated) DEVICE — SOL NACL 0.9% IRRIG 3000ML BAG 2B7477

## (undated) DEVICE — CLIP APPLIER ENDO 5MM M/L LIGAMAX EL5ML

## (undated) DEVICE — ENDO TROCAR SLEEVE KII Z-THREADED 05X100MM CTS02

## (undated) DEVICE — SUCTION IRR STRYKERFLOW II W/TIP 250-070-520

## (undated) DEVICE — ENDO TROCAR BLUNT TIP KII BALLOON 12X100MM C0R47

## (undated) DEVICE — SU VICRYL 0 UR-6 27" J603H

## (undated) DEVICE — LINEN FULL SHEET 5511

## (undated) DEVICE — GLOVE PROTEXIS POWDER FREE 7.5 ORTHOPEDIC 2D73ET75

## (undated) DEVICE — Device

## (undated) DEVICE — ENDO SPONGE ENDOSTICK KITTNER 5MM CHERRY 37002010

## (undated) DEVICE — ENDO TROCAR FIRST ENTRY KII FIOS ADV FIX 05X100MM CFF03

## (undated) DEVICE — BAG CLEAR TRASH 1.3M 39X33" P4040C

## (undated) DEVICE — ESU GROUND PAD ADULT W/CORD E7507

## (undated) DEVICE — BLADE CLIPPER 3M 9670

## (undated) DEVICE — LINEN TOWEL PACK X10 5473

## (undated) DEVICE — ENDO POUCH UNIV RETRIEVAL SYSTEM INZII 10MM CD001

## (undated) DEVICE — SUCTION CANISTER MEDIVAC LINER 3000ML W/LID 65651-530

## (undated) DEVICE — LINEN POUCH DBL 5427

## (undated) DEVICE — SU VICRYL 4-0 PS-2 18" UND J496H

## (undated) DEVICE — SYR 50ML LL W/O NDL 309653

## (undated) DEVICE — LINEN HALF SHEET 5512

## (undated) DEVICE — GLOVE PROTEXIS BLUE W/NEU-THERA 8.0  2D73EB80

## (undated) DEVICE — ESU CORD MONOPOLAR 10'  E0510

## (undated) RX ORDER — PROPOFOL 10 MG/ML
INJECTION, EMULSION INTRAVENOUS
Status: DISPENSED
Start: 2022-01-10

## (undated) RX ORDER — ONDANSETRON 2 MG/ML
INJECTION INTRAMUSCULAR; INTRAVENOUS
Status: DISPENSED
Start: 2022-01-10

## (undated) RX ORDER — GLYCOPYRROLATE 0.2 MG/ML
INJECTION INTRAMUSCULAR; INTRAVENOUS
Status: DISPENSED
Start: 2022-01-10

## (undated) RX ORDER — LIDOCAINE HYDROCHLORIDE 10 MG/ML
INJECTION, SOLUTION EPIDURAL; INFILTRATION; INTRACAUDAL; PERINEURAL
Status: DISPENSED
Start: 2022-01-10

## (undated) RX ORDER — FENTANYL CITRATE-0.9 % NACL/PF 10 MCG/ML
PLASTIC BAG, INJECTION (ML) INTRAVENOUS
Status: DISPENSED
Start: 2022-01-10

## (undated) RX ORDER — FENTANYL CITRATE 50 UG/ML
INJECTION, SOLUTION INTRAMUSCULAR; INTRAVENOUS
Status: DISPENSED
Start: 2022-01-10

## (undated) RX ORDER — OXYCODONE HYDROCHLORIDE 5 MG/1
TABLET ORAL
Status: DISPENSED
Start: 2022-01-10

## (undated) RX ORDER — BUPIVACAINE HYDROCHLORIDE 5 MG/ML
INJECTION, SOLUTION EPIDURAL; INTRACAUDAL
Status: DISPENSED
Start: 2022-01-10

## (undated) RX ORDER — DEXAMETHASONE SODIUM PHOSPHATE 4 MG/ML
INJECTION, SOLUTION INTRA-ARTICULAR; INTRALESIONAL; INTRAMUSCULAR; INTRAVENOUS; SOFT TISSUE
Status: DISPENSED
Start: 2022-01-10

## (undated) RX ORDER — CEFAZOLIN SODIUM/WATER 2 G/20 ML
SYRINGE (ML) INTRAVENOUS
Status: DISPENSED
Start: 2022-01-10